# Patient Record
Sex: MALE | Race: WHITE | NOT HISPANIC OR LATINO | Employment: OTHER | URBAN - METROPOLITAN AREA
[De-identification: names, ages, dates, MRNs, and addresses within clinical notes are randomized per-mention and may not be internally consistent; named-entity substitution may affect disease eponyms.]

---

## 2017-01-01 ENCOUNTER — APPOINTMENT (OUTPATIENT)
Dept: CARDIOLOGY | Facility: HOSPITAL | Age: 77
End: 2017-01-01
Attending: INTERNAL MEDICINE

## 2017-01-01 ENCOUNTER — APPOINTMENT (OUTPATIENT)
Dept: GENERAL RADIOLOGY | Facility: HOSPITAL | Age: 77
End: 2017-01-01

## 2017-01-01 ENCOUNTER — APPOINTMENT (OUTPATIENT)
Dept: NEUROLOGY | Facility: HOSPITAL | Age: 77
End: 2017-01-01
Attending: PSYCHIATRY & NEUROLOGY

## 2017-01-01 ENCOUNTER — APPOINTMENT (OUTPATIENT)
Dept: CARDIOLOGY | Facility: HOSPITAL | Age: 77
End: 2017-01-01

## 2017-01-01 ENCOUNTER — APPOINTMENT (OUTPATIENT)
Dept: MRI IMAGING | Facility: HOSPITAL | Age: 77
End: 2017-01-01

## 2017-01-01 ENCOUNTER — HOSPITAL ENCOUNTER (INPATIENT)
Facility: HOSPITAL | Age: 77
LOS: 4 days | End: 2017-04-10
Attending: FAMILY MEDICINE | Admitting: FAMILY MEDICINE

## 2017-01-01 ENCOUNTER — APPOINTMENT (OUTPATIENT)
Dept: CT IMAGING | Facility: HOSPITAL | Age: 77
End: 2017-01-01

## 2017-01-01 ENCOUNTER — HOSPITAL ENCOUNTER (INPATIENT)
Facility: HOSPITAL | Age: 77
LOS: 13 days | End: 2017-04-06
Attending: INTERNAL MEDICINE | Admitting: INTERNAL MEDICINE

## 2017-01-01 VITALS
RESPIRATION RATE: 20 BRPM | WEIGHT: 146.9 LBS | TEMPERATURE: 97.7 F | BODY MASS INDEX: 23.61 KG/M2 | DIASTOLIC BLOOD PRESSURE: 53 MMHG | HEART RATE: 89 BPM | HEIGHT: 66 IN | SYSTOLIC BLOOD PRESSURE: 94 MMHG | OXYGEN SATURATION: 93 %

## 2017-01-01 VITALS
HEIGHT: 66 IN | BODY MASS INDEX: 23.61 KG/M2 | WEIGHT: 146.9 LBS | HEART RATE: 77 BPM | OXYGEN SATURATION: 86 % | RESPIRATION RATE: 22 BRPM | SYSTOLIC BLOOD PRESSURE: 84 MMHG | TEMPERATURE: 98.2 F | DIASTOLIC BLOOD PRESSURE: 47 MMHG

## 2017-01-01 DIAGNOSIS — J96.01 ACUTE RESPIRATORY FAILURE WITH HYPOXIA (HCC): ICD-10-CM

## 2017-01-01 DIAGNOSIS — R13.10 DYSPHAGIA, UNSPECIFIED TYPE: Primary | ICD-10-CM

## 2017-01-01 DIAGNOSIS — Z74.09 IMPAIRED FUNCTIONAL MOBILITY, BALANCE, GAIT, AND ENDURANCE: ICD-10-CM

## 2017-01-01 DIAGNOSIS — Z74.09 IMPAIRED MOBILITY AND ADLS: ICD-10-CM

## 2017-01-01 DIAGNOSIS — Z78.9 IMPAIRED MOBILITY AND ADLS: ICD-10-CM

## 2017-01-01 LAB
ALBUMIN SERPL-MCNC: 3.6 G/DL (ref 3.2–4.8)
ALBUMIN SERPL-MCNC: 3.6 G/DL (ref 3.2–4.8)
ALBUMIN SERPL-MCNC: 3.8 G/DL (ref 3.2–4.8)
ALBUMIN SERPL-MCNC: 4.2 G/DL (ref 3.2–4.8)
ALBUMIN/GLOB SERPL: 1.2 G/DL (ref 1.5–2.5)
ALBUMIN/GLOB SERPL: 1.2 G/DL (ref 1.5–2.5)
ALBUMIN/GLOB SERPL: 1.3 G/DL (ref 1.5–2.5)
ALP SERPL-CCNC: 68 U/L (ref 25–100)
ALP SERPL-CCNC: 71 U/L (ref 25–100)
ALP SERPL-CCNC: 74 U/L (ref 25–100)
ALP SERPL-CCNC: 98 U/L (ref 25–100)
ALT SERPL W P-5'-P-CCNC: 15 U/L (ref 7–40)
ALT SERPL W P-5'-P-CCNC: 25 U/L (ref 7–40)
AMMONIA BLD-SCNC: 35 UMOL/L (ref 19–60)
AMMONIA BLD-SCNC: 47 UMOL/L (ref 19–60)
ANION GAP SERPL CALCULATED.3IONS-SCNC: 11 MMOL/L (ref 3–11)
ANION GAP SERPL CALCULATED.3IONS-SCNC: 12 MMOL/L (ref 3–11)
ANION GAP SERPL CALCULATED.3IONS-SCNC: 14 MMOL/L (ref 3–11)
ANION GAP SERPL CALCULATED.3IONS-SCNC: 14 MMOL/L (ref 3–11)
ANION GAP SERPL CALCULATED.3IONS-SCNC: 15 MMOL/L (ref 3–11)
ANION GAP SERPL CALCULATED.3IONS-SCNC: 16 MMOL/L (ref 3–11)
ANION GAP SERPL CALCULATED.3IONS-SCNC: 24 MMOL/L (ref 3–11)
ANION GAP SERPL CALCULATED.3IONS-SCNC: 5 MMOL/L (ref 3–11)
ANION GAP SERPL CALCULATED.3IONS-SCNC: 5 MMOL/L (ref 3–11)
ANION GAP SERPL CALCULATED.3IONS-SCNC: 6 MMOL/L (ref 3–11)
ANION GAP SERPL CALCULATED.3IONS-SCNC: 7 MMOL/L (ref 3–11)
APTT PPP: 104.6 SECONDS (ref 24–31)
APTT PPP: 36.3 SECONDS (ref 24–31)
APTT PPP: 43.7 SECONDS (ref 24–31)
APTT PPP: 44 SECONDS (ref 24–31)
APTT PPP: 44.1 SECONDS (ref 24–31)
APTT PPP: 44.1 SECONDS (ref 24–31)
APTT PPP: 46.6 SECONDS (ref 24–31)
APTT PPP: 47.9 SECONDS (ref 24–31)
APTT PPP: 48.5 SECONDS (ref 24–31)
APTT PPP: 49.9 SECONDS (ref 24–31)
APTT PPP: 50.8 SECONDS (ref 24–31)
APTT PPP: 52 SECONDS (ref 24–31)
APTT PPP: 53.7 SECONDS (ref 24–31)
APTT PPP: 55.5 SECONDS (ref 24–31)
APTT PPP: 67.6 SECONDS (ref 24–31)
ARTERIAL PATENCY WRIST A: ABNORMAL
ARTERIAL PATENCY WRIST A: ABNORMAL
ARTICHOKE IGE QN: 174 MG/DL (ref 0–130)
AST SERPL-CCNC: 49 U/L (ref 0–33)
AST SERPL-CCNC: 62 U/L (ref 0–33)
AST SERPL-CCNC: 66 U/L (ref 0–33)
AST SERPL-CCNC: 77 U/L (ref 0–33)
ATMOSPHERIC PRESS: ABNORMAL MMHG
ATMOSPHERIC PRESS: ABNORMAL MMHG
BACTERIA SPEC AEROBE CULT: NORMAL
BACTERIA UR QL AUTO: ABNORMAL /HPF
BASE EXCESS BLDA CALC-SCNC: -7.6 MMOL/L (ref 0–2)
BASE EXCESS BLDA CALC-SCNC: 4.1 MMOL/L (ref 0–2)
BASOPHILS # BLD AUTO: 0 10*3/MM3 (ref 0–0.2)
BASOPHILS # BLD AUTO: 0.02 10*3/MM3 (ref 0–0.2)
BASOPHILS NFR BLD AUTO: 0 % (ref 0–1)
BASOPHILS NFR BLD AUTO: 0.2 % (ref 0–1)
BDY SITE: ABNORMAL
BDY SITE: ABNORMAL
BH CV ECHO MEAS - AO MAX PG (FULL): 3.2 MMHG
BH CV ECHO MEAS - AO MAX PG: 4.4 MMHG
BH CV ECHO MEAS - AO MEAN PG (FULL): 1 MMHG
BH CV ECHO MEAS - AO MEAN PG: 2 MMHG
BH CV ECHO MEAS - AO ROOT AREA (BSA CORRECTED): 1.8
BH CV ECHO MEAS - AO ROOT AREA: 8.6 CM^2
BH CV ECHO MEAS - AO ROOT DIAM: 3.3 CM
BH CV ECHO MEAS - AO V2 MAX: 105 CM/SEC
BH CV ECHO MEAS - AO V2 MEAN: 65.4 CM/SEC
BH CV ECHO MEAS - AO V2 VTI: 21.4 CM
BH CV ECHO MEAS - BSA(HAYCOCK): 1.8 M^2
BH CV ECHO MEAS - BSA: 1.8 M^2
BH CV ECHO MEAS - BZI_BMI: 24.7 KILOGRAMS/M^2
BH CV ECHO MEAS - BZI_METRIC_HEIGHT: 167.6 CM
BH CV ECHO MEAS - BZI_METRIC_WEIGHT: 69.4 KG
BH CV ECHO MEAS - EDV(CUBED): 191.1 ML
BH CV ECHO MEAS - EDV(TEICH): 163.9 ML
BH CV ECHO MEAS - EF(CUBED): 18.5 %
BH CV ECHO MEAS - EF(TEICH): 14.5 %
BH CV ECHO MEAS - ESV(CUBED): 155.7 ML
BH CV ECHO MEAS - ESV(TEICH): 140.1 ML
BH CV ECHO MEAS - FS: 6.6 %
BH CV ECHO MEAS - IVS/LVPW: 0.99
BH CV ECHO MEAS - IVSD: 0.86 CM
BH CV ECHO MEAS - LA DIMENSION: 4.2 CM
BH CV ECHO MEAS - LA/AO: 1.3
BH CV ECHO MEAS - LV MASS(C)D: 189.2 GRAMS
BH CV ECHO MEAS - LV MASS(C)DI: 106 GRAMS/M^2
BH CV ECHO MEAS - LV MAX PG: 1.2 MMHG
BH CV ECHO MEAS - LV MEAN PG: 1 MMHG
BH CV ECHO MEAS - LV V1 MAX: 54.9 CM/SEC
BH CV ECHO MEAS - LV V1 MEAN: 33.7 CM/SEC
BH CV ECHO MEAS - LV V1 VTI: 8.5 CM
BH CV ECHO MEAS - LVIDD: 5.8 CM
BH CV ECHO MEAS - LVIDS: 5.4 CM
BH CV ECHO MEAS - LVPWD: 0.86 CM
BH CV ECHO MEAS - MV A MAX VEL: 65.2 CM/SEC
BH CV ECHO MEAS - MV DEC TIME: 0.11 SEC
BH CV ECHO MEAS - MV E MAX VEL: 68.1 CM/SEC
BH CV ECHO MEAS - MV E/A: 1
BH CV ECHO MEAS - PA ACC SLOPE: 812.5 CM/SEC^2
BH CV ECHO MEAS - PA ACC TIME: 0.11 SEC
BH CV ECHO MEAS - PA MAX PG: 3.1 MMHG
BH CV ECHO MEAS - PA PR(ACCEL): 29.1 MMHG
BH CV ECHO MEAS - PA V2 MAX: 86.2 CM/SEC
BH CV ECHO MEAS - RVDD: 1.8 CM
BH CV ECHO MEAS - SI(AO): 102.5 ML/M^2
BH CV ECHO MEAS - SI(CUBED): 19.8 ML/M^2
BH CV ECHO MEAS - SI(TEICH): 13.3 ML/M^2
BH CV ECHO MEAS - SV(AO): 183 ML
BH CV ECHO MEAS - SV(CUBED): 35.4 ML
BH CV ECHO MEAS - SV(TEICH): 23.8 ML
BH CV ECHO MEAS - TR MAX VEL: 264 CM/SEC
BH CV ECHO MEAS - TV MAX PG: 0.72 MMHG
BH CV ECHO MEAS - TV V2 MAX: 42.4 CM/SEC
BH CV LOWER VASCULAR LEFT COMMON FEMORAL AUGMENT: NORMAL
BH CV LOWER VASCULAR LEFT COMMON FEMORAL COMPRESS: NORMAL
BH CV LOWER VASCULAR LEFT COMMON FEMORAL PHASIC: NORMAL
BH CV LOWER VASCULAR LEFT COMMON FEMORAL SPONT: NORMAL
BH CV LOWER VASCULAR LEFT DISTAL FEMORAL COMPRESS: NORMAL
BH CV LOWER VASCULAR LEFT GASTRONEMIUS COMPRESS: NORMAL
BH CV LOWER VASCULAR LEFT LESSER SAPH COMPRESS: NORMAL
BH CV LOWER VASCULAR LEFT MID FEMORAL AUGMENT: NORMAL
BH CV LOWER VASCULAR LEFT MID FEMORAL COMPRESS: NORMAL
BH CV LOWER VASCULAR LEFT MID FEMORAL PHASIC: NORMAL
BH CV LOWER VASCULAR LEFT MID FEMORAL SPONT: NORMAL
BH CV LOWER VASCULAR LEFT PERONEAL COMPRESS: NORMAL
BH CV LOWER VASCULAR LEFT POPLITEAL AUGMENT: NORMAL
BH CV LOWER VASCULAR LEFT POPLITEAL COMPRESS: NORMAL
BH CV LOWER VASCULAR LEFT POPLITEAL PHASIC: NORMAL
BH CV LOWER VASCULAR LEFT POPLITEAL SPONT: NORMAL
BH CV LOWER VASCULAR LEFT POSTERIOR TIBIAL COMPRESS: NORMAL
BH CV LOWER VASCULAR LEFT PROXIMAL FEMORAL COMPRESS: NORMAL
BH CV LOWER VASCULAR LEFT SAPHENOFEMORAL JUNCTION AUGMENT: NORMAL
BH CV LOWER VASCULAR LEFT SAPHENOFEMORAL JUNCTION COMPRESS: NORMAL
BH CV LOWER VASCULAR LEFT SAPHENOFEMORAL JUNCTION PHASIC: NORMAL
BH CV LOWER VASCULAR LEFT SAPHENOFEMORAL JUNCTION SPONT: NORMAL
BILIRUB SERPL-MCNC: 0.7 MG/DL (ref 0.3–1.2)
BILIRUB SERPL-MCNC: 0.9 MG/DL (ref 0.3–1.2)
BILIRUB SERPL-MCNC: 1 MG/DL (ref 0.3–1.2)
BILIRUB SERPL-MCNC: 1.1 MG/DL (ref 0.3–1.2)
BILIRUB UR QL STRIP: NEGATIVE
BNP SERPL-MCNC: 1824 PG/ML (ref 0–100)
BNP SERPL-MCNC: 3675 PG/ML (ref 0–100)
BUN BLD-MCNC: 16 MG/DL (ref 9–23)
BUN BLD-MCNC: 21 MG/DL (ref 9–23)
BUN BLD-MCNC: 26 MG/DL (ref 9–23)
BUN BLD-MCNC: 37 MG/DL (ref 9–23)
BUN BLD-MCNC: 37 MG/DL (ref 9–23)
BUN BLD-MCNC: 39 MG/DL (ref 9–23)
BUN BLD-MCNC: 43 MG/DL (ref 9–23)
BUN BLD-MCNC: 43 MG/DL (ref 9–23)
BUN BLD-MCNC: 46 MG/DL (ref 9–23)
BUN BLD-MCNC: 51 MG/DL (ref 9–23)
BUN BLD-MCNC: 54 MG/DL (ref 9–23)
BUN/CREAT SERPL: 10.7 (ref 7–25)
BUN/CREAT SERPL: 14 (ref 7–25)
BUN/CREAT SERPL: 17.3 (ref 7–25)
BUN/CREAT SERPL: 30.8 (ref 7–25)
BUN/CREAT SERPL: 33.6 (ref 7–25)
BUN/CREAT SERPL: 39.1 (ref 7–25)
BUN/CREAT SERPL: 39.1 (ref 7–25)
BUN/CREAT SERPL: 41.8 (ref 7–25)
BUN/CREAT SERPL: 51 (ref 7–25)
BUN/CREAT SERPL: 54 (ref 7–25)
CA-I SERPL ISE-MCNC: 1.04 MMOL/L (ref 1.12–1.32)
CA-I SERPL ISE-MCNC: 1.06 MMOL/L (ref 1.12–1.32)
CA-I SERPL ISE-MCNC: 1.07 MMOL/L (ref 1.12–1.32)
CA-I SERPL ISE-MCNC: 1.09 MMOL/L (ref 1.12–1.32)
CALCIUM SPEC-SCNC: 8.9 MG/DL (ref 8.7–10.4)
CALCIUM SPEC-SCNC: 9 MG/DL (ref 8.7–10.4)
CALCIUM SPEC-SCNC: 9.2 MG/DL (ref 8.7–10.4)
CALCIUM SPEC-SCNC: 9.3 MG/DL (ref 8.7–10.4)
CALCIUM SPEC-SCNC: 9.4 MG/DL (ref 8.7–10.4)
CALCIUM SPEC-SCNC: 9.5 MG/DL (ref 8.7–10.4)
CALCIUM SPEC-SCNC: 9.7 MG/DL (ref 8.7–10.4)
CALCIUM SPEC-SCNC: 9.7 MG/DL (ref 8.7–10.4)
CHLORIDE SERPL-SCNC: 100 MMOL/L (ref 99–109)
CHLORIDE SERPL-SCNC: 102 MMOL/L (ref 99–109)
CHLORIDE SERPL-SCNC: 102 MMOL/L (ref 99–109)
CHLORIDE SERPL-SCNC: 103 MMOL/L (ref 99–109)
CHLORIDE SERPL-SCNC: 104 MMOL/L (ref 99–109)
CHLORIDE SERPL-SCNC: 104 MMOL/L (ref 99–109)
CHLORIDE SERPL-SCNC: 105 MMOL/L (ref 99–109)
CHOLEST SERPL-MCNC: 163 MG/DL (ref 0–200)
CHOLEST SERPL-MCNC: 216 MG/DL (ref 0–200)
CK SERPL-CCNC: 1412 U/L (ref 26–174)
CLARITY UR: ABNORMAL
CO2 BLDA-SCNC: 18.4 MMOL/L (ref 22–33)
CO2 BLDA-SCNC: 28.6 MMOL/L (ref 22–33)
CO2 SERPL-SCNC: 13 MMOL/L (ref 20–31)
CO2 SERPL-SCNC: 21 MMOL/L (ref 20–31)
CO2 SERPL-SCNC: 22 MMOL/L (ref 20–31)
CO2 SERPL-SCNC: 23 MMOL/L (ref 20–31)
CO2 SERPL-SCNC: 25 MMOL/L (ref 20–31)
CO2 SERPL-SCNC: 26 MMOL/L (ref 20–31)
CO2 SERPL-SCNC: 27 MMOL/L (ref 20–31)
CO2 SERPL-SCNC: 28 MMOL/L (ref 20–31)
CO2 SERPL-SCNC: 28 MMOL/L (ref 20–31)
CO2 SERPL-SCNC: 31 MMOL/L (ref 20–31)
CO2 SERPL-SCNC: 32 MMOL/L (ref 20–31)
COHGB MFR BLD: 1.5 % (ref 0–2)
COHGB MFR BLD: 1.7 % (ref 0–2)
COLOR UR: YELLOW
CREAT BLD-MCNC: 1 MG/DL (ref 0.6–1.3)
CREAT BLD-MCNC: 1 MG/DL (ref 0.6–1.3)
CREAT BLD-MCNC: 1.1 MG/DL (ref 0.6–1.3)
CREAT BLD-MCNC: 1.2 MG/DL (ref 0.6–1.3)
CREAT BLD-MCNC: 1.5 MG/DL (ref 0.6–1.3)
CRP SERPL-MCNC: 86.1 MG/DL (ref 0–10)
D-LACTATE SERPL-SCNC: 3.3 MMOL/L (ref 0.5–2)
D-LACTATE SERPL-SCNC: 5.8 MMOL/L (ref 0.5–2)
D-LACTATE SERPL-SCNC: 6.1 MMOL/L (ref 0.5–2)
DEPRECATED RDW RBC AUTO: 48.6 FL (ref 37–54)
DEPRECATED RDW RBC AUTO: 49.3 FL (ref 37–54)
DEPRECATED RDW RBC AUTO: 50.1 FL (ref 37–54)
DEPRECATED RDW RBC AUTO: 50.4 FL (ref 37–54)
DEPRECATED RDW RBC AUTO: 50.4 FL (ref 37–54)
DEPRECATED RDW RBC AUTO: 53.8 FL (ref 37–54)
EOSINOPHIL # BLD AUTO: 0 10*3/MM3 (ref 0.1–0.3)
EOSINOPHIL # BLD AUTO: 0 10*3/MM3 (ref 0.1–0.3)
EOSINOPHIL # BLD AUTO: 0.01 10*3/MM3 (ref 0.1–0.3)
EOSINOPHIL # BLD AUTO: 0.09 10*3/MM3 (ref 0.1–0.3)
EOSINOPHIL NFR BLD AUTO: 0 % (ref 0–3)
EOSINOPHIL NFR BLD AUTO: 0 % (ref 0–3)
EOSINOPHIL NFR BLD AUTO: 0.1 % (ref 0–3)
EOSINOPHIL NFR BLD AUTO: 1 % (ref 0–3)
ERYTHROCYTE [DISTWIDTH] IN BLOOD BY AUTOMATED COUNT: 13.9 % (ref 11.3–14.5)
ERYTHROCYTE [DISTWIDTH] IN BLOOD BY AUTOMATED COUNT: 14 % (ref 11.3–14.5)
ERYTHROCYTE [DISTWIDTH] IN BLOOD BY AUTOMATED COUNT: 14 % (ref 11.3–14.5)
ERYTHROCYTE [DISTWIDTH] IN BLOOD BY AUTOMATED COUNT: 14.1 % (ref 11.3–14.5)
ERYTHROCYTE [DISTWIDTH] IN BLOOD BY AUTOMATED COUNT: 14.1 % (ref 11.3–14.5)
ERYTHROCYTE [DISTWIDTH] IN BLOOD BY AUTOMATED COUNT: 14.7 % (ref 11.3–14.5)
GFR SERPL CREATININE-BSD FRML MDRD: 45 ML/MIN/1.73
GFR SERPL CREATININE-BSD FRML MDRD: 59 ML/MIN/1.73
GFR SERPL CREATININE-BSD FRML MDRD: 65 ML/MIN/1.73
GFR SERPL CREATININE-BSD FRML MDRD: 72 ML/MIN/1.73
GFR SERPL CREATININE-BSD FRML MDRD: 72 ML/MIN/1.73
GLOBULIN UR ELPH-MCNC: 2.8 GM/DL
GLOBULIN UR ELPH-MCNC: 2.9 GM/DL
GLOBULIN UR ELPH-MCNC: 3.5 GM/DL
GLUCOSE BLD-MCNC: 116 MG/DL (ref 70–100)
GLUCOSE BLD-MCNC: 133 MG/DL (ref 70–100)
GLUCOSE BLD-MCNC: 139 MG/DL (ref 70–100)
GLUCOSE BLD-MCNC: 146 MG/DL (ref 70–100)
GLUCOSE BLD-MCNC: 170 MG/DL (ref 70–100)
GLUCOSE BLD-MCNC: 180 MG/DL (ref 70–100)
GLUCOSE BLD-MCNC: 259 MG/DL (ref 70–100)
GLUCOSE BLD-MCNC: 267 MG/DL (ref 70–100)
GLUCOSE BLD-MCNC: 272 MG/DL (ref 70–100)
GLUCOSE BLD-MCNC: 302 MG/DL (ref 70–100)
GLUCOSE BLD-MCNC: 335 MG/DL (ref 70–100)
GLUCOSE BLDC GLUCOMTR-MCNC: 106 MG/DL (ref 70–130)
GLUCOSE BLDC GLUCOMTR-MCNC: 107 MG/DL (ref 70–130)
GLUCOSE BLDC GLUCOMTR-MCNC: 120 MG/DL (ref 70–130)
GLUCOSE BLDC GLUCOMTR-MCNC: 121 MG/DL (ref 70–130)
GLUCOSE BLDC GLUCOMTR-MCNC: 122 MG/DL (ref 70–130)
GLUCOSE BLDC GLUCOMTR-MCNC: 129 MG/DL (ref 70–130)
GLUCOSE BLDC GLUCOMTR-MCNC: 131 MG/DL (ref 70–130)
GLUCOSE BLDC GLUCOMTR-MCNC: 131 MG/DL (ref 70–130)
GLUCOSE BLDC GLUCOMTR-MCNC: 132 MG/DL (ref 70–130)
GLUCOSE BLDC GLUCOMTR-MCNC: 133 MG/DL (ref 70–130)
GLUCOSE BLDC GLUCOMTR-MCNC: 134 MG/DL (ref 70–130)
GLUCOSE BLDC GLUCOMTR-MCNC: 135 MG/DL (ref 70–130)
GLUCOSE BLDC GLUCOMTR-MCNC: 135 MG/DL (ref 70–130)
GLUCOSE BLDC GLUCOMTR-MCNC: 143 MG/DL (ref 70–130)
GLUCOSE BLDC GLUCOMTR-MCNC: 148 MG/DL (ref 70–130)
GLUCOSE BLDC GLUCOMTR-MCNC: 156 MG/DL (ref 70–130)
GLUCOSE BLDC GLUCOMTR-MCNC: 157 MG/DL (ref 70–130)
GLUCOSE BLDC GLUCOMTR-MCNC: 161 MG/DL (ref 70–130)
GLUCOSE BLDC GLUCOMTR-MCNC: 168 MG/DL (ref 70–130)
GLUCOSE BLDC GLUCOMTR-MCNC: 173 MG/DL (ref 70–130)
GLUCOSE BLDC GLUCOMTR-MCNC: 183 MG/DL (ref 70–130)
GLUCOSE BLDC GLUCOMTR-MCNC: 190 MG/DL (ref 70–130)
GLUCOSE BLDC GLUCOMTR-MCNC: 192 MG/DL (ref 70–130)
GLUCOSE BLDC GLUCOMTR-MCNC: 194 MG/DL (ref 70–130)
GLUCOSE BLDC GLUCOMTR-MCNC: 217 MG/DL (ref 70–130)
GLUCOSE BLDC GLUCOMTR-MCNC: 220 MG/DL (ref 70–130)
GLUCOSE BLDC GLUCOMTR-MCNC: 224 MG/DL (ref 70–130)
GLUCOSE BLDC GLUCOMTR-MCNC: 226 MG/DL (ref 70–130)
GLUCOSE BLDC GLUCOMTR-MCNC: 229 MG/DL (ref 70–130)
GLUCOSE BLDC GLUCOMTR-MCNC: 230 MG/DL (ref 70–130)
GLUCOSE BLDC GLUCOMTR-MCNC: 230 MG/DL (ref 70–130)
GLUCOSE BLDC GLUCOMTR-MCNC: 237 MG/DL (ref 70–130)
GLUCOSE BLDC GLUCOMTR-MCNC: 242 MG/DL (ref 70–130)
GLUCOSE BLDC GLUCOMTR-MCNC: 244 MG/DL (ref 70–130)
GLUCOSE BLDC GLUCOMTR-MCNC: 253 MG/DL (ref 70–130)
GLUCOSE BLDC GLUCOMTR-MCNC: 255 MG/DL (ref 70–130)
GLUCOSE BLDC GLUCOMTR-MCNC: 261 MG/DL (ref 70–130)
GLUCOSE BLDC GLUCOMTR-MCNC: 264 MG/DL (ref 70–130)
GLUCOSE BLDC GLUCOMTR-MCNC: 271 MG/DL (ref 70–130)
GLUCOSE BLDC GLUCOMTR-MCNC: 292 MG/DL (ref 70–130)
GLUCOSE BLDC GLUCOMTR-MCNC: 307 MG/DL (ref 70–130)
GLUCOSE BLDC GLUCOMTR-MCNC: 323 MG/DL (ref 70–130)
GLUCOSE UR STRIP-MCNC: NEGATIVE MG/DL
GRAM STN SPEC: NORMAL
HBA1C MFR BLD: 7.2 % (ref 4.8–5.6)
HCO3 BLDA-SCNC: 17.4 MMOL/L (ref 20–26)
HCO3 BLDA-SCNC: 27.5 MMOL/L (ref 20–26)
HCT VFR BLD AUTO: 43.3 % (ref 38.9–50.9)
HCT VFR BLD AUTO: 43.5 % (ref 38.9–50.9)
HCT VFR BLD AUTO: 44 % (ref 38.9–50.9)
HCT VFR BLD AUTO: 45.8 % (ref 38.9–50.9)
HCT VFR BLD AUTO: 49.8 % (ref 38.9–50.9)
HCT VFR BLD AUTO: 51.5 % (ref 38.9–50.9)
HCT VFR BLD CALC: 46.6 %
HCT VFR BLD CALC: 54.6 %
HDLC SERPL-MCNC: 39 MG/DL (ref 40–60)
HGB BLD-MCNC: 14.3 G/DL (ref 13.1–17.5)
HGB BLD-MCNC: 14.8 G/DL (ref 13.1–17.5)
HGB BLD-MCNC: 15.3 G/DL (ref 13.1–17.5)
HGB BLD-MCNC: 15.9 G/DL (ref 13.1–17.5)
HGB BLD-MCNC: 17.1 G/DL (ref 13.1–17.5)
HGB BLD-MCNC: 17.6 G/DL (ref 13.1–17.5)
HGB BLDA-MCNC: 15.2 G/DL (ref 13.5–17.5)
HGB BLDA-MCNC: 17.8 G/DL (ref 13.5–17.5)
HGB UR QL STRIP.AUTO: ABNORMAL
HOROWITZ INDEX BLD+IHG-RTO: 35 %
HOROWITZ INDEX BLD+IHG-RTO: 90 %
HYALINE CASTS UR QL AUTO: ABNORMAL /LPF
IMM GRANULOCYTES # BLD: 0.01 10*3/MM3 (ref 0–0.03)
IMM GRANULOCYTES # BLD: 0.01 10*3/MM3 (ref 0–0.03)
IMM GRANULOCYTES # BLD: 0.03 10*3/MM3 (ref 0–0.03)
IMM GRANULOCYTES # BLD: 0.03 10*3/MM3 (ref 0–0.03)
IMM GRANULOCYTES NFR BLD: 0.1 % (ref 0–0.6)
IMM GRANULOCYTES NFR BLD: 0.1 % (ref 0–0.6)
IMM GRANULOCYTES NFR BLD: 0.3 % (ref 0–0.6)
IMM GRANULOCYTES NFR BLD: 0.3 % (ref 0–0.6)
INR PPP: 1.3
KETONES UR QL STRIP: NEGATIVE
LEUKOCYTE ESTERASE UR QL STRIP.AUTO: ABNORMAL
LYMPHOCYTES # BLD AUTO: 0.43 10*3/MM3 (ref 0.6–4.8)
LYMPHOCYTES # BLD AUTO: 0.93 10*3/MM3 (ref 0.6–4.8)
LYMPHOCYTES # BLD AUTO: 0.93 10*3/MM3 (ref 0.6–4.8)
LYMPHOCYTES # BLD AUTO: 1.44 10*3/MM3 (ref 0.6–4.8)
LYMPHOCYTES NFR BLD AUTO: 11.2 % (ref 24–44)
LYMPHOCYTES NFR BLD AUTO: 16.7 % (ref 24–44)
LYMPHOCYTES NFR BLD AUTO: 3.8 % (ref 24–44)
LYMPHOCYTES NFR BLD AUTO: 7.9 % (ref 24–44)
MAGNESIUM SERPL-MCNC: 2 MG/DL (ref 1.3–2.7)
MAGNESIUM SERPL-MCNC: 2.1 MG/DL (ref 1.3–2.7)
MAGNESIUM SERPL-MCNC: 2.2 MG/DL (ref 1.3–2.7)
MAGNESIUM SERPL-MCNC: 2.4 MG/DL (ref 1.3–2.7)
MAGNESIUM SERPL-MCNC: 2.5 MG/DL (ref 1.3–2.7)
MCH RBC QN AUTO: 32.7 PG (ref 27–31)
MCH RBC QN AUTO: 33.2 PG (ref 27–31)
MCH RBC QN AUTO: 33.3 PG (ref 27–31)
MCH RBC QN AUTO: 33.3 PG (ref 27–31)
MCH RBC QN AUTO: 33.7 PG (ref 27–31)
MCH RBC QN AUTO: 33.8 PG (ref 27–31)
MCHC RBC AUTO-ENTMCNC: 32.9 G/DL (ref 32–36)
MCHC RBC AUTO-ENTMCNC: 34.2 G/DL (ref 32–36)
MCHC RBC AUTO-ENTMCNC: 34.2 G/DL (ref 32–36)
MCHC RBC AUTO-ENTMCNC: 34.3 G/DL (ref 32–36)
MCHC RBC AUTO-ENTMCNC: 34.7 G/DL (ref 32–36)
MCHC RBC AUTO-ENTMCNC: 34.8 G/DL (ref 32–36)
MCV RBC AUTO: 101.4 FL (ref 80–99)
MCV RBC AUTO: 95.6 FL (ref 80–99)
MCV RBC AUTO: 95.9 FL (ref 80–99)
MCV RBC AUTO: 96.7 FL (ref 80–99)
MCV RBC AUTO: 97 FL (ref 80–99)
MCV RBC AUTO: 98.8 FL (ref 80–99)
METHGB BLD QL: 0.4 % (ref 0–1.5)
METHGB BLD QL: 0.7 % (ref 0–1.5)
MODALITY: ABNORMAL
MODALITY: ABNORMAL
MONOCYTES # BLD AUTO: 0.46 10*3/MM3 (ref 0–1)
MONOCYTES # BLD AUTO: 0.48 10*3/MM3 (ref 0–1)
MONOCYTES # BLD AUTO: 0.58 10*3/MM3 (ref 0–1)
MONOCYTES # BLD AUTO: 0.65 10*3/MM3 (ref 0–1)
MONOCYTES NFR BLD AUTO: 4.1 % (ref 0–12)
MONOCYTES NFR BLD AUTO: 5.1 % (ref 0–12)
MONOCYTES NFR BLD AUTO: 5.5 % (ref 0–12)
MONOCYTES NFR BLD AUTO: 7.5 % (ref 0–12)
NEUTROPHILS # BLD AUTO: 10.35 10*3/MM3 (ref 1.5–8.3)
NEUTROPHILS # BLD AUTO: 10.39 10*3/MM3 (ref 1.5–8.3)
NEUTROPHILS # BLD AUTO: 6.39 10*3/MM3 (ref 1.5–8.3)
NEUTROPHILS # BLD AUTO: 6.94 10*3/MM3 (ref 1.5–8.3)
NEUTROPHILS NFR BLD AUTO: 74.3 % (ref 41–71)
NEUTROPHILS NFR BLD AUTO: 83.2 % (ref 41–71)
NEUTROPHILS NFR BLD AUTO: 87.7 % (ref 41–71)
NEUTROPHILS NFR BLD AUTO: 90.9 % (ref 41–71)
NITRITE UR QL STRIP: NEGATIVE
OXYHGB MFR BLDV: 92.8 % (ref 94–99)
OXYHGB MFR BLDV: 96.7 % (ref 94–99)
PCO2 BLDA: 32.8 MM HG (ref 35–48)
PCO2 BLDA: 36 MM HG (ref 35–48)
PH BLDA: 7.33 PH UNITS (ref 7.35–7.45)
PH BLDA: 7.49 PH UNITS (ref 7.35–7.45)
PH UR STRIP.AUTO: <=5 [PH] (ref 5–8)
PHOSPHATE SERPL-MCNC: 2.4 MG/DL (ref 2.4–5.1)
PHOSPHATE SERPL-MCNC: 2.5 MG/DL (ref 2.4–5.1)
PHOSPHATE SERPL-MCNC: 2.7 MG/DL (ref 2.4–5.1)
PHOSPHATE SERPL-MCNC: 3 MG/DL (ref 2.4–5.1)
PHOSPHATE SERPL-MCNC: 3.3 MG/DL (ref 2.4–5.1)
PHOSPHATE SERPL-MCNC: 4 MG/DL (ref 2.4–5.1)
PHOSPHATE SERPL-MCNC: 4.1 MG/DL (ref 2.4–5.1)
PLATELET # BLD AUTO: 117 10*3/MM3 (ref 150–450)
PLATELET # BLD AUTO: 120 10*3/MM3 (ref 150–450)
PLATELET # BLD AUTO: 126 10*3/MM3 (ref 150–450)
PLATELET # BLD AUTO: 129 10*3/MM3 (ref 150–450)
PLATELET # BLD AUTO: 132 10*3/MM3 (ref 150–450)
PLATELET # BLD AUTO: 156 10*3/MM3 (ref 150–450)
PMV BLD AUTO: 11.2 FL (ref 6–12)
PMV BLD AUTO: 11.2 FL (ref 6–12)
PMV BLD AUTO: 11.5 FL (ref 6–12)
PMV BLD AUTO: 11.5 FL (ref 6–12)
PMV BLD AUTO: 11.6 FL (ref 6–12)
PMV BLD AUTO: 11.6 FL (ref 6–12)
PO2 BLDA: 105 MM HG (ref 83–108)
PO2 BLDA: 74.3 MM HG (ref 83–108)
POTASSIUM BLD-SCNC: 3.3 MMOL/L (ref 3.5–5.5)
POTASSIUM BLD-SCNC: 3.3 MMOL/L (ref 3.5–5.5)
POTASSIUM BLD-SCNC: 3.6 MMOL/L (ref 3.5–5.5)
POTASSIUM BLD-SCNC: 3.6 MMOL/L (ref 3.5–5.5)
POTASSIUM BLD-SCNC: 3.8 MMOL/L (ref 3.5–5.5)
POTASSIUM BLD-SCNC: 3.9 MMOL/L (ref 3.5–5.5)
POTASSIUM BLD-SCNC: 4 MMOL/L (ref 3.5–5.5)
POTASSIUM BLD-SCNC: 4 MMOL/L (ref 3.5–5.5)
POTASSIUM BLD-SCNC: 4.1 MMOL/L (ref 3.5–5.5)
POTASSIUM BLD-SCNC: 4.6 MMOL/L (ref 3.5–5.5)
PREALB SERPL-MCNC: 10.1 MG/DL (ref 10–40)
PROCALCITONIN SERPL-MCNC: 0.4 NG/ML
PROT SERPL-MCNC: 6.4 G/DL (ref 5.7–8.2)
PROT SERPL-MCNC: 6.5 G/DL (ref 5.7–8.2)
PROT SERPL-MCNC: 6.7 G/DL (ref 5.7–8.2)
PROT SERPL-MCNC: 7.7 G/DL (ref 5.7–8.2)
PROT UR QL STRIP: NEGATIVE
PROTHROMBIN TIME: 14.3 SECONDS (ref 9.6–11.5)
RBC # BLD AUTO: 4.29 10*6/MM3 (ref 4.2–5.76)
RBC # BLD AUTO: 4.53 10*6/MM3 (ref 4.2–5.76)
RBC # BLD AUTO: 4.59 10*6/MM3 (ref 4.2–5.76)
RBC # BLD AUTO: 4.72 10*6/MM3 (ref 4.2–5.76)
RBC # BLD AUTO: 5.15 10*6/MM3 (ref 4.2–5.76)
RBC # BLD AUTO: 5.21 10*6/MM3 (ref 4.2–5.76)
RBC # UR: ABNORMAL /HPF
REF LAB TEST METHOD: ABNORMAL
RIGHT ANT TIBEAL SYS PSV: 86 CM/S
RIGHT CFA PROX SYS PSV: 111 CM/SEC
RIGHT PERONEAL SYS PSV: 35 CM/S
RIGHT POPLITEAL DIST SYS PSV: 90 CM/S
RIGHT POPLITEAL PROX SYS PSV: 62 CM/S
RIGHT POST TIBIAL SYS PSV: 74 CM/S
RIGHT PROFUNDA SYS PSV: 182 CM/S
RIGHT SUPER FEMORAL DIST SYS PSV: 78 CM/S
RIGHT SUPER FEMORAL MID SYS PSV: 112 CM/S
RIGHT SUPER FEMORAL PROX SYS PSV: 341 CM/S
SAO2 % BLDCOA: 92.8 %
SODIUM BLD-SCNC: 137 MMOL/L (ref 132–146)
SODIUM BLD-SCNC: 138 MMOL/L (ref 132–146)
SODIUM BLD-SCNC: 139 MMOL/L (ref 132–146)
SODIUM BLD-SCNC: 140 MMOL/L (ref 132–146)
SODIUM BLD-SCNC: 142 MMOL/L (ref 132–146)
SODIUM BLD-SCNC: 142 MMOL/L (ref 132–146)
SP GR UR STRIP: 1.01 (ref 1–1.03)
SQUAMOUS #/AREA URNS HPF: ABNORMAL /HPF
T4 FREE SERPL-MCNC: 1.11 NG/DL (ref 0.89–1.76)
TRIGL SERPL-MCNC: 114 MG/DL (ref 0–150)
TRIGL SERPL-MCNC: 118 MG/DL (ref 0–150)
TROPONIN I SERPL-MCNC: 17.4 NG/ML
TROPONIN I SERPL-MCNC: 27.54 NG/ML
TROPONIN I SERPL-MCNC: 38.15 NG/ML
TROPONIN I SERPL-MCNC: 40.93 NG/ML
TSH SERPL DL<=0.05 MIU/L-ACNC: 3.58 MIU/ML (ref 0.35–5.35)
TSH SERPL DL<=0.05 MIU/L-ACNC: 5.26 MIU/ML (ref 0.35–5.35)
TSH SERPL DL<=0.05 MIU/L-ACNC: 9.72 MIU/ML (ref 0.35–5.35)
UROBILINOGEN UR QL STRIP: ABNORMAL
VANCOMYCIN TROUGH SERPL-MCNC: 11.2 MCG/ML (ref 10–20)
WBC NRBC COR # BLD: 10.2 10*3/MM3 (ref 3.5–10.8)
WBC NRBC COR # BLD: 11.42 10*3/MM3 (ref 3.5–10.8)
WBC NRBC COR # BLD: 11.79 10*3/MM3 (ref 3.5–10.8)
WBC NRBC COR # BLD: 8.34 10*3/MM3 (ref 3.5–10.8)
WBC NRBC COR # BLD: 8.42 10*3/MM3 (ref 3.5–10.8)
WBC NRBC COR # BLD: 8.62 10*3/MM3 (ref 3.5–10.8)
WBC UR QL AUTO: ABNORMAL /HPF

## 2017-01-01 PROCEDURE — 94799 UNLISTED PULMONARY SVC/PX: CPT

## 2017-01-01 PROCEDURE — 84132 ASSAY OF SERUM POTASSIUM: CPT | Performed by: INTERNAL MEDICINE

## 2017-01-01 PROCEDURE — 25010000003 CEFTRIAXONE PER 250 MG: Performed by: NURSE PRACTITIONER

## 2017-01-01 PROCEDURE — 82962 GLUCOSE BLOOD TEST: CPT

## 2017-01-01 PROCEDURE — 84443 ASSAY THYROID STIM HORMONE: CPT | Performed by: NURSE PRACTITIONER

## 2017-01-01 PROCEDURE — 25010000002 MORPHINE SULFATE (PF) 2 MG/ML SOLUTION: Performed by: NURSE PRACTITIONER

## 2017-01-01 PROCEDURE — 94760 N-INVAS EAR/PLS OXIMETRY 1: CPT

## 2017-01-01 PROCEDURE — 99232 SBSQ HOSP IP/OBS MODERATE 35: CPT | Performed by: PSYCHIATRY & NEUROLOGY

## 2017-01-01 PROCEDURE — 94003 VENT MGMT INPAT SUBQ DAY: CPT

## 2017-01-01 PROCEDURE — 97167 OT EVAL HIGH COMPLEX 60 MIN: CPT

## 2017-01-01 PROCEDURE — 25010000002 LEVETRIRACETAM PER 10 MG: Performed by: PSYCHIATRY & NEUROLOGY

## 2017-01-01 PROCEDURE — C8929 TTE W OR WO FOL WCON,DOPPLER: HCPCS

## 2017-01-01 PROCEDURE — 85025 COMPLETE CBC W/AUTO DIFF WBC: CPT | Performed by: NURSE PRACTITIONER

## 2017-01-01 PROCEDURE — 82805 BLOOD GASES W/O2 SATURATION: CPT | Performed by: NURSE PRACTITIONER

## 2017-01-01 PROCEDURE — 95819 EEG AWAKE AND ASLEEP: CPT

## 2017-01-01 PROCEDURE — 25010000002 MORPHINE SULFATE (PF) 2 MG/ML SOLUTION: Performed by: FAMILY MEDICINE

## 2017-01-01 PROCEDURE — 93971 EXTREMITY STUDY: CPT

## 2017-01-01 PROCEDURE — 85027 COMPLETE CBC AUTOMATED: CPT | Performed by: INTERNAL MEDICINE

## 2017-01-01 PROCEDURE — 84100 ASSAY OF PHOSPHORUS: CPT | Performed by: INTERNAL MEDICINE

## 2017-01-01 PROCEDURE — 81001 URINALYSIS AUTO W/SCOPE: CPT | Performed by: NURSE PRACTITIONER

## 2017-01-01 PROCEDURE — 85730 THROMBOPLASTIN TIME PARTIAL: CPT

## 2017-01-01 PROCEDURE — 99291 CRITICAL CARE FIRST HOUR: CPT | Performed by: INTERNAL MEDICINE

## 2017-01-01 PROCEDURE — 84145 PROCALCITONIN (PCT): CPT | Performed by: NURSE PRACTITIONER

## 2017-01-01 PROCEDURE — 99232 SBSQ HOSP IP/OBS MODERATE 35: CPT | Performed by: HOSPITALIST

## 2017-01-01 PROCEDURE — 99232 SBSQ HOSP IP/OBS MODERATE 35: CPT | Performed by: INTERNAL MEDICINE

## 2017-01-01 PROCEDURE — 84443 ASSAY THYROID STIM HORMONE: CPT | Performed by: INTERNAL MEDICINE

## 2017-01-01 PROCEDURE — 87205 SMEAR GRAM STAIN: CPT | Performed by: INTERNAL MEDICINE

## 2017-01-01 PROCEDURE — 94640 AIRWAY INHALATION TREATMENT: CPT

## 2017-01-01 PROCEDURE — 80061 LIPID PANEL: CPT | Performed by: NURSE PRACTITIONER

## 2017-01-01 PROCEDURE — 80048 BASIC METABOLIC PNL TOTAL CA: CPT | Performed by: INTERNAL MEDICINE

## 2017-01-01 PROCEDURE — 71010 HC CHEST PA OR AP: CPT

## 2017-01-01 PROCEDURE — 25010000002 FUROSEMIDE PER 20 MG: Performed by: INTERNAL MEDICINE

## 2017-01-01 PROCEDURE — 97110 THERAPEUTIC EXERCISES: CPT

## 2017-01-01 PROCEDURE — 25010000002 HEPARIN (PORCINE) PER 1000 UNITS: Performed by: INTERNAL MEDICINE

## 2017-01-01 PROCEDURE — 25010000002 LORAZEPAM PER 2 MG: Performed by: FAMILY MEDICINE

## 2017-01-01 PROCEDURE — 63710000001 INSULIN REGULAR HUMAN PER 5 UNITS

## 2017-01-01 PROCEDURE — 25010000002 PIPERACILLIN-TAZOBACTAM: Performed by: HOSPITALIST

## 2017-01-01 PROCEDURE — 85730 THROMBOPLASTIN TIME PARTIAL: CPT | Performed by: NURSE PRACTITIONER

## 2017-01-01 PROCEDURE — 99239 HOSP IP/OBS DSCHRG MGMT >30: CPT | Performed by: PHYSICIAN ASSISTANT

## 2017-01-01 PROCEDURE — 84100 ASSAY OF PHOSPHORUS: CPT | Performed by: NURSE PRACTITIONER

## 2017-01-01 PROCEDURE — 36600 WITHDRAWAL OF ARTERIAL BLOOD: CPT | Performed by: INTERNAL MEDICINE

## 2017-01-01 PROCEDURE — 83880 ASSAY OF NATRIURETIC PEPTIDE: CPT | Performed by: NURSE PRACTITIONER

## 2017-01-01 PROCEDURE — 93926 LOWER EXTREMITY STUDY: CPT

## 2017-01-01 PROCEDURE — 80053 COMPREHEN METABOLIC PANEL: CPT | Performed by: NURSE PRACTITIONER

## 2017-01-01 PROCEDURE — 25010000002 HEPARIN (PORCINE) PER 1000 UNITS: Performed by: NURSE PRACTITIONER

## 2017-01-01 PROCEDURE — 82805 BLOOD GASES W/O2 SATURATION: CPT | Performed by: INTERNAL MEDICINE

## 2017-01-01 PROCEDURE — 5A1945Z RESPIRATORY VENTILATION, 24-96 CONSECUTIVE HOURS: ICD-10-PCS | Performed by: INTERNAL MEDICINE

## 2017-01-01 PROCEDURE — 92610 EVALUATE SWALLOWING FUNCTION: CPT

## 2017-01-01 PROCEDURE — 25010000002 HEPARIN (PORCINE) PER 1000 UNITS

## 2017-01-01 PROCEDURE — 84484 ASSAY OF TROPONIN QUANT: CPT | Performed by: NURSE PRACTITIONER

## 2017-01-01 PROCEDURE — 83735 ASSAY OF MAGNESIUM: CPT | Performed by: INTERNAL MEDICINE

## 2017-01-01 PROCEDURE — 95819 EEG AWAKE AND ASLEEP: CPT | Performed by: PSYCHIATRY & NEUROLOGY

## 2017-01-01 PROCEDURE — 94002 VENT MGMT INPAT INIT DAY: CPT

## 2017-01-01 PROCEDURE — 93005 ELECTROCARDIOGRAM TRACING: CPT | Performed by: INTERNAL MEDICINE

## 2017-01-01 PROCEDURE — 63710000001 INSULIN DETEMIR PER 5 UNITS: Performed by: HOSPITALIST

## 2017-01-01 PROCEDURE — 63710000001 INSULIN LISPRO (HUMAN) PER 5 UNITS: Performed by: HOSPITALIST

## 2017-01-01 PROCEDURE — 25010000002 SULFUR HEXAFLUORIDE MICROSPH 60.7-25 MG RECONSTITUTED SUSPENSION: Performed by: INTERNAL MEDICINE

## 2017-01-01 PROCEDURE — 93005 ELECTROCARDIOGRAM TRACING: CPT | Performed by: NURSE PRACTITIONER

## 2017-01-01 PROCEDURE — 80048 BASIC METABOLIC PNL TOTAL CA: CPT | Performed by: NURSE PRACTITIONER

## 2017-01-01 PROCEDURE — 93926 LOWER EXTREMITY STUDY: CPT | Performed by: INTERNAL MEDICINE

## 2017-01-01 PROCEDURE — 25010000002 VANCOMYCIN PER 500 MG

## 2017-01-01 PROCEDURE — 99223 1ST HOSP IP/OBS HIGH 75: CPT | Performed by: PSYCHIATRY & NEUROLOGY

## 2017-01-01 PROCEDURE — 84478 ASSAY OF TRIGLYCERIDES: CPT | Performed by: INTERNAL MEDICINE

## 2017-01-01 PROCEDURE — 25010000002 VANCOMYCIN HCL IN NACL 1.25-0.9 GM/250ML-% SOLUTION: Performed by: HOSPITALIST

## 2017-01-01 PROCEDURE — 70551 MRI BRAIN STEM W/O DYE: CPT

## 2017-01-01 PROCEDURE — 83605 ASSAY OF LACTIC ACID: CPT | Performed by: INTERNAL MEDICINE

## 2017-01-01 PROCEDURE — 36600 WITHDRAWAL OF ARTERIAL BLOOD: CPT | Performed by: NURSE PRACTITIONER

## 2017-01-01 PROCEDURE — 80202 ASSAY OF VANCOMYCIN: CPT

## 2017-01-01 PROCEDURE — 80053 COMPREHEN METABOLIC PANEL: CPT | Performed by: INTERNAL MEDICINE

## 2017-01-01 PROCEDURE — 82550 ASSAY OF CK (CPK): CPT | Performed by: NURSE PRACTITIONER

## 2017-01-01 PROCEDURE — 87070 CULTURE OTHR SPECIMN AEROBIC: CPT | Performed by: INTERNAL MEDICINE

## 2017-01-01 PROCEDURE — 97164 PT RE-EVAL EST PLAN CARE: CPT

## 2017-01-01 PROCEDURE — 63710000001 INSULIN REGULAR HUMAN PER 5 UNITS: Performed by: HOSPITALIST

## 2017-01-01 PROCEDURE — 82330 ASSAY OF CALCIUM: CPT | Performed by: INTERNAL MEDICINE

## 2017-01-01 PROCEDURE — 93971 EXTREMITY STUDY: CPT | Performed by: INTERNAL MEDICINE

## 2017-01-01 PROCEDURE — 83735 ASSAY OF MAGNESIUM: CPT | Performed by: NURSE PRACTITIONER

## 2017-01-01 PROCEDURE — 82140 ASSAY OF AMMONIA: CPT | Performed by: INTERNAL MEDICINE

## 2017-01-01 PROCEDURE — 83036 HEMOGLOBIN GLYCOSYLATED A1C: CPT | Performed by: NURSE PRACTITIONER

## 2017-01-01 PROCEDURE — 92526 ORAL FUNCTION THERAPY: CPT

## 2017-01-01 PROCEDURE — 86140 C-REACTIVE PROTEIN: CPT | Performed by: INTERNAL MEDICINE

## 2017-01-01 PROCEDURE — 84134 ASSAY OF PREALBUMIN: CPT | Performed by: INTERNAL MEDICINE

## 2017-01-01 PROCEDURE — 70450 CT HEAD/BRAIN W/O DYE: CPT

## 2017-01-01 PROCEDURE — 3E0G76Z INTRODUCTION OF NUTRITIONAL SUBSTANCE INTO UPPER GI, VIA NATURAL OR ARTIFICIAL OPENING: ICD-10-PCS | Performed by: INTERNAL MEDICINE

## 2017-01-01 PROCEDURE — 80048 BASIC METABOLIC PNL TOTAL CA: CPT | Performed by: HOSPITALIST

## 2017-01-01 PROCEDURE — 82330 ASSAY OF CALCIUM: CPT | Performed by: NURSE PRACTITIONER

## 2017-01-01 PROCEDURE — 93306 TTE W/DOPPLER COMPLETE: CPT | Performed by: INTERNAL MEDICINE

## 2017-01-01 PROCEDURE — 25010000003 LEVETIRACETAM IN NACL 0.54% 1500 MG/100ML SOLUTION: Performed by: PSYCHIATRY & NEUROLOGY

## 2017-01-01 PROCEDURE — 82465 ASSAY BLD/SERUM CHOLESTEROL: CPT | Performed by: INTERNAL MEDICINE

## 2017-01-01 PROCEDURE — 83605 ASSAY OF LACTIC ACID: CPT | Performed by: NURSE PRACTITIONER

## 2017-01-01 PROCEDURE — 87040 BLOOD CULTURE FOR BACTERIA: CPT | Performed by: NURSE PRACTITIONER

## 2017-01-01 PROCEDURE — 87040 BLOOD CULTURE FOR BACTERIA: CPT | Performed by: HOSPITALIST

## 2017-01-01 PROCEDURE — 99231 SBSQ HOSP IP/OBS SF/LOW 25: CPT | Performed by: PHYSICIAN ASSISTANT

## 2017-01-01 PROCEDURE — 85610 PROTHROMBIN TIME: CPT | Performed by: NURSE PRACTITIONER

## 2017-01-01 PROCEDURE — 99233 SBSQ HOSP IP/OBS HIGH 50: CPT | Performed by: INTERNAL MEDICINE

## 2017-01-01 PROCEDURE — 87086 URINE CULTURE/COLONY COUNT: CPT | Performed by: NURSE PRACTITIONER

## 2017-01-01 PROCEDURE — 63710000001 INSULIN REGULAR HUMAN PER 5 UNITS: Performed by: NURSE PRACTITIONER

## 2017-01-01 PROCEDURE — 25010000003 POTASSIUM CHLORIDE 10 MEQ/100ML SOLUTION: Performed by: INTERNAL MEDICINE

## 2017-01-01 PROCEDURE — 93010 ELECTROCARDIOGRAM REPORT: CPT | Performed by: INTERNAL MEDICINE

## 2017-01-01 PROCEDURE — 99233 SBSQ HOSP IP/OBS HIGH 50: CPT | Performed by: HOSPITALIST

## 2017-01-01 PROCEDURE — 85025 COMPLETE CBC W/AUTO DIFF WBC: CPT | Performed by: HOSPITALIST

## 2017-01-01 PROCEDURE — 84439 ASSAY OF FREE THYROXINE: CPT | Performed by: NURSE PRACTITIONER

## 2017-01-01 PROCEDURE — 97161 PT EVAL LOW COMPLEX 20 MIN: CPT

## 2017-01-01 PROCEDURE — 99221 1ST HOSP IP/OBS SF/LOW 40: CPT | Performed by: INTERNAL MEDICINE

## 2017-01-01 RX ORDER — SPIRONOLACTONE 25 MG/1
25 TABLET ORAL DAILY
Status: DISCONTINUED | OUTPATIENT
Start: 2017-01-01 | End: 2017-01-01

## 2017-01-01 RX ORDER — ONDANSETRON 4 MG/1
4 TABLET, FILM COATED ORAL EVERY 6 HOURS PRN
Status: DISCONTINUED | OUTPATIENT
Start: 2017-01-01 | End: 2017-01-01 | Stop reason: HOSPADM

## 2017-01-01 RX ORDER — AMOXICILLIN AND CLAVULANATE POTASSIUM 875; 125 MG/1; MG/1
1 TABLET, FILM COATED ORAL EVERY 12 HOURS SCHEDULED
Status: DISCONTINUED | OUTPATIENT
Start: 2017-01-01 | End: 2017-01-01

## 2017-01-01 RX ORDER — PRAVASTATIN SODIUM 40 MG
80 TABLET ORAL NIGHTLY
Status: DISCONTINUED | OUTPATIENT
Start: 2017-01-01 | End: 2017-01-01

## 2017-01-01 RX ORDER — VANCOMYCIN HYDROCHLORIDE 1 G/200ML
1000 INJECTION, SOLUTION INTRAVENOUS EVERY 24 HOURS
Status: DISCONTINUED | OUTPATIENT
Start: 2017-01-01 | End: 2017-01-01

## 2017-01-01 RX ORDER — SACCHAROMYCES BOULARDII 250 MG
250 CAPSULE ORAL 2 TIMES DAILY
Status: CANCELLED | OUTPATIENT
Start: 2017-01-01

## 2017-01-01 RX ORDER — ATROPINE SULFATE 10 MG/ML
2 SOLUTION/ DROPS OPHTHALMIC
Status: DISCONTINUED | OUTPATIENT
Start: 2017-01-01 | End: 2017-01-01 | Stop reason: HOSPADM

## 2017-01-01 RX ORDER — ACETAMINOPHEN 650 MG/1
650 SUPPOSITORY RECTAL EVERY 4 HOURS PRN
Refills: 0
Start: 2017-01-01

## 2017-01-01 RX ORDER — KETOROLAC TROMETHAMINE 15 MG/ML
15 INJECTION, SOLUTION INTRAMUSCULAR; INTRAVENOUS EVERY 6 HOURS PRN
Status: DISCONTINUED | OUTPATIENT
Start: 2017-01-01 | End: 2017-01-01 | Stop reason: HOSPADM

## 2017-01-01 RX ORDER — POTASSIUM CHLORIDE 750 MG/1
40 CAPSULE, EXTENDED RELEASE ORAL AS NEEDED
Status: CANCELLED | OUTPATIENT
Start: 2017-01-01

## 2017-01-01 RX ORDER — MORPHINE SULFATE 2 MG/ML
1 INJECTION, SOLUTION INTRAMUSCULAR; INTRAVENOUS EVERY 6 HOURS
Status: DISCONTINUED | OUTPATIENT
Start: 2017-01-01 | End: 2017-01-01 | Stop reason: HOSPADM

## 2017-01-01 RX ORDER — LISINOPRIL 5 MG/1
5 TABLET ORAL
Status: DISCONTINUED | OUTPATIENT
Start: 2017-01-01 | End: 2017-01-01

## 2017-01-01 RX ORDER — MORPHINE SULFATE 2 MG/ML
2 INJECTION, SOLUTION INTRAMUSCULAR; INTRAVENOUS
Status: DISCONTINUED | OUTPATIENT
Start: 2017-01-01 | End: 2017-01-01 | Stop reason: HOSPADM

## 2017-01-01 RX ORDER — ONDANSETRON 4 MG/1
4 TABLET, FILM COATED ORAL EVERY 6 HOURS PRN
Status: CANCELLED | OUTPATIENT
Start: 2017-01-01

## 2017-01-01 RX ORDER — GLYCOPYRROLATE 0.2 MG/ML
0.4 INJECTION INTRAMUSCULAR; INTRAVENOUS EVERY 4 HOURS
Status: CANCELLED | OUTPATIENT
Start: 2017-01-01

## 2017-01-01 RX ORDER — ONDANSETRON 2 MG/ML
4 INJECTION INTRAMUSCULAR; INTRAVENOUS EVERY 6 HOURS PRN
Status: CANCELLED | OUTPATIENT
Start: 2017-01-01

## 2017-01-01 RX ORDER — CHLORHEXIDINE GLUCONATE 0.12 MG/ML
15 RINSE ORAL EVERY 12 HOURS SCHEDULED
Status: DISCONTINUED | OUTPATIENT
Start: 2017-01-01 | End: 2017-01-01

## 2017-01-01 RX ORDER — SODIUM CHLORIDE 0.9 % (FLUSH) 0.9 %
1-10 SYRINGE (ML) INJECTION AS NEEDED
Status: CANCELLED | OUTPATIENT
Start: 2017-01-01

## 2017-01-01 RX ORDER — FUROSEMIDE 40 MG/1
40 TABLET ORAL 2 TIMES DAILY
Status: DISCONTINUED | OUTPATIENT
Start: 2017-01-01 | End: 2017-01-01

## 2017-01-01 RX ORDER — CLOPIDOGREL BISULFATE 75 MG/1
75 TABLET ORAL DAILY
Status: DISCONTINUED | OUTPATIENT
Start: 2017-01-01 | End: 2017-01-01

## 2017-01-01 RX ORDER — IPRATROPIUM BROMIDE AND ALBUTEROL SULFATE 2.5; .5 MG/3ML; MG/3ML
3 SOLUTION RESPIRATORY (INHALATION)
Status: DISCONTINUED | OUTPATIENT
Start: 2017-01-01 | End: 2017-01-01

## 2017-01-01 RX ORDER — IPRATROPIUM BROMIDE AND ALBUTEROL SULFATE 2.5; .5 MG/3ML; MG/3ML
3 SOLUTION RESPIRATORY (INHALATION) EVERY 4 HOURS PRN
Status: CANCELLED | OUTPATIENT
Start: 2017-01-01

## 2017-01-01 RX ORDER — ASPIRIN 81 MG/1
81 TABLET, CHEWABLE ORAL DAILY
Status: DISCONTINUED | OUTPATIENT
Start: 2017-01-01 | End: 2017-01-01

## 2017-01-01 RX ORDER — DEXTROSE MONOHYDRATE 25 G/50ML
25 INJECTION, SOLUTION INTRAVENOUS
Status: DISCONTINUED | OUTPATIENT
Start: 2017-01-01 | End: 2017-01-01 | Stop reason: DRUGHIGH

## 2017-01-01 RX ORDER — LEVETIRACETAM 100 MG/ML
250 SOLUTION ORAL EVERY 12 HOURS SCHEDULED
Status: CANCELLED | OUTPATIENT
Start: 2017-01-01

## 2017-01-01 RX ORDER — LORAZEPAM 2 MG/ML
2 INJECTION INTRAMUSCULAR
Status: DISCONTINUED | OUTPATIENT
Start: 2017-01-01 | End: 2017-01-01 | Stop reason: HOSPADM

## 2017-01-01 RX ORDER — ACETAMINOPHEN 650 MG/1
650 SUPPOSITORY RECTAL EVERY 4 HOURS PRN
Status: CANCELLED | OUTPATIENT
Start: 2017-01-01

## 2017-01-01 RX ORDER — POTASSIUM CHLORIDE 7.45 MG/ML
10 INJECTION INTRAVENOUS
Status: CANCELLED | OUTPATIENT
Start: 2017-01-01

## 2017-01-01 RX ORDER — ONDANSETRON 2 MG/ML
4 INJECTION INTRAMUSCULAR; INTRAVENOUS EVERY 6 HOURS PRN
Status: DISCONTINUED | OUTPATIENT
Start: 2017-01-01 | End: 2017-01-01 | Stop reason: HOSPADM

## 2017-01-01 RX ORDER — LORAZEPAM 2 MG/ML
0.5 INJECTION INTRAMUSCULAR EVERY 8 HOURS
Status: DISCONTINUED | OUTPATIENT
Start: 2017-01-01 | End: 2017-01-01 | Stop reason: HOSPADM

## 2017-01-01 RX ORDER — VANCOMYCIN HYDROCHLORIDE
20 ONCE
Status: COMPLETED | OUTPATIENT
Start: 2017-01-01 | End: 2017-01-01

## 2017-01-01 RX ORDER — DEXMEDETOMIDINE HYDROCHLORIDE 4 UG/ML
.2-1.5 INJECTION, SOLUTION INTRAVENOUS
Status: DISCONTINUED | OUTPATIENT
Start: 2017-01-01 | End: 2017-01-01

## 2017-01-01 RX ORDER — POTASSIUM CHLORIDE 1.5 G/1.77G
40 POWDER, FOR SOLUTION ORAL AS NEEDED
Status: DISCONTINUED | OUTPATIENT
Start: 2017-01-01 | End: 2017-01-01 | Stop reason: HOSPADM

## 2017-01-01 RX ORDER — CLOPIDOGREL BISULFATE 75 MG/1
600 TABLET ORAL ONCE
Status: COMPLETED | OUTPATIENT
Start: 2017-01-01 | End: 2017-01-01

## 2017-01-01 RX ORDER — CARVEDILOL 3.12 MG/1
3.12 TABLET ORAL EVERY 12 HOURS SCHEDULED
Status: DISCONTINUED | OUTPATIENT
Start: 2017-01-01 | End: 2017-01-01

## 2017-01-01 RX ORDER — GLYCOPYRROLATE 0.2 MG/ML
0.4 INJECTION INTRAMUSCULAR; INTRAVENOUS EVERY 8 HOURS
Status: DISCONTINUED | OUTPATIENT
Start: 2017-01-01 | End: 2017-01-01 | Stop reason: HOSPADM

## 2017-01-01 RX ORDER — LEVETIRACETAM 250 MG/1
250 TABLET ORAL EVERY 12 HOURS SCHEDULED
Status: DISCONTINUED | OUTPATIENT
Start: 2017-01-01 | End: 2017-01-01 | Stop reason: SDUPTHER

## 2017-01-01 RX ORDER — LEVETIRACETAM 100 MG/ML
250 SOLUTION ORAL EVERY 12 HOURS SCHEDULED
Start: 2017-01-01

## 2017-01-01 RX ORDER — FAMOTIDINE 10 MG/ML
20 INJECTION, SOLUTION INTRAVENOUS 2 TIMES DAILY
Status: DISCONTINUED | OUTPATIENT
Start: 2017-01-01 | End: 2017-01-01

## 2017-01-01 RX ORDER — HEPARIN SODIUM 5000 [USP'U]/ML
5000 INJECTION, SOLUTION INTRAVENOUS; SUBCUTANEOUS EVERY 8 HOURS SCHEDULED
Status: DISCONTINUED | OUTPATIENT
Start: 2017-01-01 | End: 2017-01-01

## 2017-01-01 RX ORDER — ACETAMINOPHEN 325 MG/1
650 TABLET ORAL EVERY 4 HOURS PRN
Status: CANCELLED | OUTPATIENT
Start: 2017-01-01

## 2017-01-01 RX ORDER — NITROGLYCERIN 20 MG/100ML
5-200 INJECTION INTRAVENOUS
Status: DISCONTINUED | OUTPATIENT
Start: 2017-01-01 | End: 2017-01-01

## 2017-01-01 RX ORDER — RIVASTIGMINE 4.6 MG/24H
1 PATCH, EXTENDED RELEASE TRANSDERMAL DAILY
Status: DISCONTINUED | OUTPATIENT
Start: 2017-01-01 | End: 2017-01-01

## 2017-01-01 RX ORDER — ASPIRIN 81 MG/1
324 TABLET, CHEWABLE ORAL ONCE
Status: COMPLETED | OUTPATIENT
Start: 2017-01-01 | End: 2017-01-01

## 2017-01-01 RX ORDER — LISINOPRIL 2.5 MG/1
2.5 TABLET ORAL
Status: DISCONTINUED | OUTPATIENT
Start: 2017-01-01 | End: 2017-01-01

## 2017-01-01 RX ORDER — CEFTRIAXONE SODIUM 1 G/50ML
1 INJECTION, SOLUTION INTRAVENOUS
Status: DISCONTINUED | OUTPATIENT
Start: 2017-01-01 | End: 2017-01-01

## 2017-01-01 RX ORDER — LIGHT MINERAL OIL, WHITE PETROLATUM 150; 850 MG/G; MG/G
OINTMENT OPHTHALMIC 3 TIMES DAILY
Status: DISCONTINUED | OUTPATIENT
Start: 2017-01-01 | End: 2017-01-01 | Stop reason: HOSPADM

## 2017-01-01 RX ORDER — DEXTROSE MONOHYDRATE 25 G/50ML
25 INJECTION, SOLUTION INTRAVENOUS
Status: DISCONTINUED | OUTPATIENT
Start: 2017-01-01 | End: 2017-01-01

## 2017-01-01 RX ORDER — DONEPEZIL HYDROCHLORIDE 10 MG/1
10 TABLET, FILM COATED ORAL NIGHTLY
COMMUNITY

## 2017-01-01 RX ORDER — MIDAZOLAM IN 0.9 % SOD.CHLORID 1 MG/ML
1-10 PLASTIC BAG, INJECTION (ML) INTRAVENOUS
Status: DISCONTINUED | OUTPATIENT
Start: 2017-01-01 | End: 2017-01-01

## 2017-01-01 RX ORDER — ASPIRIN 81 MG/1
81 TABLET ORAL DAILY
Status: DISCONTINUED | OUTPATIENT
Start: 2017-01-01 | End: 2017-01-01 | Stop reason: ALTCHOICE

## 2017-01-01 RX ORDER — GLYCOPYRROLATE 0.2 MG/ML
0.4 INJECTION INTRAMUSCULAR; INTRAVENOUS EVERY 4 HOURS
Status: DISCONTINUED | OUTPATIENT
Start: 2017-01-01 | End: 2017-01-01

## 2017-01-01 RX ORDER — SIMVASTATIN 40 MG
40 TABLET ORAL DAILY
COMMUNITY
Start: 2014-04-08

## 2017-01-01 RX ORDER — LEVETIRACETAM 100 MG/ML
250 SOLUTION ORAL EVERY 12 HOURS SCHEDULED
Status: DISCONTINUED | OUTPATIENT
Start: 2017-01-01 | End: 2017-01-01 | Stop reason: HOSPADM

## 2017-01-01 RX ORDER — SODIUM CHLORIDE 0.9 % (FLUSH) 0.9 %
1-10 SYRINGE (ML) INJECTION AS NEEDED
Status: DISCONTINUED | OUTPATIENT
Start: 2017-01-01 | End: 2017-01-01 | Stop reason: HOSPADM

## 2017-01-01 RX ORDER — NICOTINE POLACRILEX 4 MG
15 LOZENGE BUCCAL
Status: DISCONTINUED | OUTPATIENT
Start: 2017-01-01 | End: 2017-01-01

## 2017-01-01 RX ORDER — IPRATROPIUM BROMIDE AND ALBUTEROL SULFATE 2.5; .5 MG/3ML; MG/3ML
3 SOLUTION RESPIRATORY (INHALATION) EVERY 4 HOURS PRN
Status: DISCONTINUED | OUTPATIENT
Start: 2017-01-01 | End: 2017-01-01 | Stop reason: HOSPADM

## 2017-01-01 RX ORDER — ASPIRIN 81 MG/1
162 TABLET ORAL DAILY
COMMUNITY

## 2017-01-01 RX ORDER — POTASSIUM CHLORIDE 1.5 G/1.77G
40 POWDER, FOR SOLUTION ORAL AS NEEDED
Status: CANCELLED | OUTPATIENT
Start: 2017-01-01

## 2017-01-01 RX ORDER — FENTANYL CITRATE-0.9 % NACL/PF 10 MCG/ML
PLASTIC BAG, INJECTION (ML) INTRAVENOUS
Status: COMPLETED
Start: 2017-01-01 | End: 2017-01-01

## 2017-01-01 RX ORDER — HEPARIN SODIUM 1000 [USP'U]/ML
60 INJECTION, SOLUTION INTRAVENOUS; SUBCUTANEOUS AS NEEDED
Status: DISCONTINUED | OUTPATIENT
Start: 2017-01-01 | End: 2017-01-01 | Stop reason: DRUGHIGH

## 2017-01-01 RX ORDER — POTASSIUM CHLORIDE 7.45 MG/ML
10 INJECTION INTRAVENOUS
Status: DISCONTINUED | OUTPATIENT
Start: 2017-01-01 | End: 2017-01-01 | Stop reason: SDUPTHER

## 2017-01-01 RX ORDER — PANTOPRAZOLE SODIUM 40 MG/10ML
40 INJECTION, POWDER, LYOPHILIZED, FOR SOLUTION INTRAVENOUS
Status: DISCONTINUED | OUTPATIENT
Start: 2017-01-01 | End: 2017-01-01

## 2017-01-01 RX ORDER — BISACODYL 10 MG
10 SUPPOSITORY, RECTAL RECTAL DAILY PRN
Status: DISCONTINUED | OUTPATIENT
Start: 2017-01-01 | End: 2017-01-01 | Stop reason: HOSPADM

## 2017-01-01 RX ORDER — ACETAMINOPHEN 650 MG/1
650 SUPPOSITORY RECTAL EVERY 4 HOURS PRN
Status: DISCONTINUED | OUTPATIENT
Start: 2017-01-01 | End: 2017-01-01 | Stop reason: HOSPADM

## 2017-01-01 RX ORDER — FENTANYL CITRATE-0.9 % NACL/PF 10 MCG/ML
50-300 PLASTIC BAG, INJECTION (ML) INTRAVENOUS
Status: DISCONTINUED | OUTPATIENT
Start: 2017-01-01 | End: 2017-01-01

## 2017-01-01 RX ORDER — HEPARIN SODIUM 1000 [USP'U]/ML
30 INJECTION, SOLUTION INTRAVENOUS; SUBCUTANEOUS AS NEEDED
Status: DISCONTINUED | OUTPATIENT
Start: 2017-01-01 | End: 2017-01-01 | Stop reason: DRUGHIGH

## 2017-01-01 RX ORDER — LORAZEPAM 2 MG/ML
0.5 INJECTION INTRAMUSCULAR EVERY 4 HOURS PRN
Status: DISCONTINUED | OUTPATIENT
Start: 2017-01-01 | End: 2017-01-01 | Stop reason: HOSPADM

## 2017-01-01 RX ORDER — GLYCOPYRROLATE 0.2 MG/ML
0.4 INJECTION INTRAMUSCULAR; INTRAVENOUS EVERY 4 HOURS
Status: DISCONTINUED | OUTPATIENT
Start: 2017-01-01 | End: 2017-01-01 | Stop reason: HOSPADM

## 2017-01-01 RX ORDER — FUROSEMIDE 10 MG/ML
40 INJECTION INTRAMUSCULAR; INTRAVENOUS 2 TIMES DAILY
Status: DISCONTINUED | OUTPATIENT
Start: 2017-01-01 | End: 2017-01-01

## 2017-01-01 RX ORDER — SACCHAROMYCES BOULARDII 250 MG
250 CAPSULE ORAL 2 TIMES DAILY
Status: DISCONTINUED | OUTPATIENT
Start: 2017-01-01 | End: 2017-01-01 | Stop reason: HOSPADM

## 2017-01-01 RX ORDER — BISACODYL 10 MG
10 SUPPOSITORY, RECTAL RECTAL ONCE
Status: DISCONTINUED | OUTPATIENT
Start: 2017-01-01 | End: 2017-01-01 | Stop reason: HOSPADM

## 2017-01-01 RX ORDER — BUMETANIDE 0.25 MG/ML
2 INJECTION INTRAMUSCULAR; INTRAVENOUS ONCE
Status: COMPLETED | OUTPATIENT
Start: 2017-01-01 | End: 2017-01-01

## 2017-01-01 RX ORDER — POTASSIUM CHLORIDE 7.45 MG/ML
10 INJECTION INTRAVENOUS
Status: DISCONTINUED | OUTPATIENT
Start: 2017-01-01 | End: 2017-01-01 | Stop reason: HOSPADM

## 2017-01-01 RX ORDER — HEPARIN SODIUM 1000 [USP'U]/ML
57.4 INJECTION, SOLUTION INTRAVENOUS; SUBCUTANEOUS ONCE
Status: COMPLETED | OUTPATIENT
Start: 2017-01-01 | End: 2017-01-01

## 2017-01-01 RX ORDER — LEVETIRACETAM 15 MG/ML
1500 INJECTION INTRAVASCULAR ONCE
Status: COMPLETED | OUTPATIENT
Start: 2017-01-01 | End: 2017-01-01

## 2017-01-01 RX ORDER — ACETAMINOPHEN 325 MG/1
650 TABLET ORAL EVERY 4 HOURS PRN
Status: DISCONTINUED | OUTPATIENT
Start: 2017-01-01 | End: 2017-01-01 | Stop reason: HOSPADM

## 2017-01-01 RX ORDER — POTASSIUM CHLORIDE 750 MG/1
40 CAPSULE, EXTENDED RELEASE ORAL AS NEEDED
Status: DISCONTINUED | OUTPATIENT
Start: 2017-01-01 | End: 2017-01-01 | Stop reason: HOSPADM

## 2017-01-01 RX ORDER — MORPHINE SULFATE 2 MG/ML
2 INJECTION, SOLUTION INTRAMUSCULAR; INTRAVENOUS
Status: CANCELLED | OUTPATIENT
Start: 2017-01-01 | End: 2017-04-15

## 2017-01-01 RX ADMIN — Medication: at 12:03

## 2017-01-01 RX ADMIN — Medication: at 09:38

## 2017-01-01 RX ADMIN — IPRATROPIUM BROMIDE AND ALBUTEROL SULFATE 3 ML: .5; 3 SOLUTION RESPIRATORY (INHALATION) at 07:27

## 2017-01-01 RX ADMIN — CLOPIDOGREL BISULFATE 600 MG: 75 TABLET, FILM COATED ORAL at 02:29

## 2017-01-01 RX ADMIN — Medication: at 21:12

## 2017-01-01 RX ADMIN — TAZOBACTAM SODIUM AND PIPERACILLIN SODIUM 4.5 G: .5; 4 INJECTION, POWDER, LYOPHILIZED, FOR SOLUTION INTRAVENOUS at 21:42

## 2017-01-01 RX ADMIN — AMOXICILLIN AND CLAVULANATE POTASSIUM 1 TABLET: 875; 125 TABLET, FILM COATED ORAL at 08:21

## 2017-01-01 RX ADMIN — ASPIRIN 81 MG: 81 TABLET, COATED ORAL at 08:07

## 2017-01-01 RX ADMIN — ASPIRIN 81 MG: 81 TABLET, COATED ORAL at 08:02

## 2017-01-01 RX ADMIN — INSULIN HUMAN 3 UNITS: 100 INJECTION, SOLUTION PARENTERAL at 18:41

## 2017-01-01 RX ADMIN — CARVEDILOL 3.12 MG: 3.12 TABLET, FILM COATED ORAL at 20:09

## 2017-01-01 RX ADMIN — Medication: at 11:17

## 2017-01-01 RX ADMIN — RIVASTIGMINE TRANSDERMAL SYSTEM 1 PATCH: 4.6 PATCH, EXTENDED RELEASE TRANSDERMAL at 08:01

## 2017-01-01 RX ADMIN — HEPARIN SODIUM 5000 UNITS: 5000 INJECTION, SOLUTION INTRAVENOUS; SUBCUTANEOUS at 05:22

## 2017-01-01 RX ADMIN — IPRATROPIUM BROMIDE AND ALBUTEROL SULFATE 3 ML: .5; 3 SOLUTION RESPIRATORY (INHALATION) at 12:45

## 2017-01-01 RX ADMIN — MORPHINE SULFATE 1 MG: 2 INJECTION, SOLUTION INTRAMUSCULAR; INTRAVENOUS at 00:08

## 2017-01-01 RX ADMIN — IPRATROPIUM BROMIDE AND ALBUTEROL SULFATE 3 ML: .5; 3 SOLUTION RESPIRATORY (INHALATION) at 06:55

## 2017-01-01 RX ADMIN — INSULIN HUMAN 3 UNITS: 100 INJECTION, SOLUTION PARENTERAL at 23:27

## 2017-01-01 RX ADMIN — HEPARIN SODIUM 5000 UNITS: 5000 INJECTION, SOLUTION INTRAVENOUS; SUBCUTANEOUS at 22:36

## 2017-01-01 RX ADMIN — IPRATROPIUM BROMIDE AND ALBUTEROL SULFATE 3 ML: .5; 3 SOLUTION RESPIRATORY (INHALATION) at 19:10

## 2017-01-01 RX ADMIN — TAZOBACTAM SODIUM AND PIPERACILLIN SODIUM 4.5 G: .5; 4 INJECTION, POWDER, LYOPHILIZED, FOR SOLUTION INTRAVENOUS at 03:57

## 2017-01-01 RX ADMIN — CHLORHEXIDINE GLUCONATE 15 ML: 1.2 RINSE ORAL at 23:49

## 2017-01-01 RX ADMIN — ROBINUL 0.4 MG: 0.2 INJECTION INTRAMUSCULAR; INTRAVENOUS at 20:10

## 2017-01-01 RX ADMIN — Medication: at 17:24

## 2017-01-01 RX ADMIN — Medication: at 21:07

## 2017-01-01 RX ADMIN — IPRATROPIUM BROMIDE AND ALBUTEROL SULFATE 3 ML: .5; 3 SOLUTION RESPIRATORY (INHALATION) at 07:11

## 2017-01-01 RX ADMIN — IPRATROPIUM BROMIDE AND ALBUTEROL SULFATE 3 ML: .5; 3 SOLUTION RESPIRATORY (INHALATION) at 12:13

## 2017-01-01 RX ADMIN — LEVETIRACETAM 250 MG: 100 INJECTION, SOLUTION INTRAVENOUS at 21:05

## 2017-01-01 RX ADMIN — PANTOPRAZOLE SODIUM 40 MG: 40 INJECTION, POWDER, FOR SOLUTION INTRAVENOUS at 05:53

## 2017-01-01 RX ADMIN — Medication: at 21:14

## 2017-01-01 RX ADMIN — IPRATROPIUM BROMIDE AND ALBUTEROL SULFATE 3 ML: .5; 3 SOLUTION RESPIRATORY (INHALATION) at 19:38

## 2017-01-01 RX ADMIN — INSULIN HUMAN 3 UNITS: 100 INJECTION, SOLUTION PARENTERAL at 06:29

## 2017-01-01 RX ADMIN — FUROSEMIDE 40 MG: 10 INJECTION, SOLUTION INTRAMUSCULAR; INTRAVENOUS at 08:16

## 2017-01-01 RX ADMIN — RIVASTIGMINE TRANSDERMAL SYSTEM 1 PATCH: 4.6 PATCH, EXTENDED RELEASE TRANSDERMAL at 12:12

## 2017-01-01 RX ADMIN — HEPARIN SODIUM 15 UNITS/KG/HR: 10000 INJECTION, SOLUTION INTRAVENOUS at 11:57

## 2017-01-01 RX ADMIN — HEPARIN SODIUM 13 UNITS/KG/HR: 10000 INJECTION, SOLUTION INTRAVENOUS at 08:47

## 2017-01-01 RX ADMIN — CHLORHEXIDINE GLUCONATE 15 ML: 1.2 RINSE ORAL at 20:10

## 2017-01-01 RX ADMIN — CLOPIDOGREL BISULFATE 75 MG: 75 TABLET, FILM COATED ORAL at 08:02

## 2017-01-01 RX ADMIN — AMOXICILLIN AND CLAVULANATE POTASSIUM 1 TABLET: 875; 125 TABLET, FILM COATED ORAL at 12:11

## 2017-01-01 RX ADMIN — CARVEDILOL 3.12 MG: 3.12 TABLET, FILM COATED ORAL at 08:34

## 2017-01-01 RX ADMIN — LISINOPRIL 2.5 MG: 2.5 TABLET ORAL at 12:01

## 2017-01-01 RX ADMIN — GLYCOPYRROLATE 0.4 MG: 0.2 INJECTION INTRAMUSCULAR; INTRAVENOUS at 06:06

## 2017-01-01 RX ADMIN — IPRATROPIUM BROMIDE AND ALBUTEROL SULFATE 3 ML: .5; 3 SOLUTION RESPIRATORY (INHALATION) at 19:13

## 2017-01-01 RX ADMIN — CLOPIDOGREL BISULFATE 75 MG: 75 TABLET, FILM COATED ORAL at 08:54

## 2017-01-01 RX ADMIN — FUROSEMIDE 40 MG: 10 INJECTION, SOLUTION INTRAMUSCULAR; INTRAVENOUS at 12:11

## 2017-01-01 RX ADMIN — CARVEDILOL 3.12 MG: 3.12 TABLET, FILM COATED ORAL at 12:12

## 2017-01-01 RX ADMIN — ROBINUL 0.4 MG: 0.2 INJECTION INTRAMUSCULAR; INTRAVENOUS at 19:35

## 2017-01-01 RX ADMIN — Medication 1 MG/HR: at 17:07

## 2017-01-01 RX ADMIN — PANTOPRAZOLE SODIUM 40 MG: 40 INJECTION, POWDER, FOR SOLUTION INTRAVENOUS at 06:16

## 2017-01-01 RX ADMIN — INSULIN HUMAN 12 UNITS: 100 INJECTION, SOLUTION PARENTERAL at 17:55

## 2017-01-01 RX ADMIN — Medication: at 21:59

## 2017-01-01 RX ADMIN — HEPARIN SODIUM 5000 UNITS: 5000 INJECTION, SOLUTION INTRAVENOUS; SUBCUTANEOUS at 21:42

## 2017-01-01 RX ADMIN — FUROSEMIDE 40 MG: 10 INJECTION, SOLUTION INTRAMUSCULAR; INTRAVENOUS at 14:05

## 2017-01-01 RX ADMIN — IPRATROPIUM BROMIDE AND ALBUTEROL SULFATE 3 ML: .5; 3 SOLUTION RESPIRATORY (INHALATION) at 01:37

## 2017-01-01 RX ADMIN — VANCOMYCIN HYDROCHLORIDE 1250 MG: 1 INJECTION, POWDER, LYOPHILIZED, FOR SOLUTION INTRAVENOUS at 15:07

## 2017-01-01 RX ADMIN — Medication 250 MG: at 09:45

## 2017-01-01 RX ADMIN — MINERAL OIL AND WHITE PETROLATUM: 150; 830 OINTMENT OPHTHALMIC at 16:30

## 2017-01-01 RX ADMIN — INSULIN HUMAN 8 UNITS: 100 INJECTION, SOLUTION PARENTERAL at 01:04

## 2017-01-01 RX ADMIN — IPRATROPIUM BROMIDE AND ALBUTEROL SULFATE 3 ML: .5; 3 SOLUTION RESPIRATORY (INHALATION) at 01:33

## 2017-01-01 RX ADMIN — CHLORHEXIDINE GLUCONATE 15 ML: 1.2 RINSE ORAL at 22:11

## 2017-01-01 RX ADMIN — AMOXICILLIN AND CLAVULANATE POTASSIUM 1 TABLET: 875; 125 TABLET, FILM COATED ORAL at 08:02

## 2017-01-01 RX ADMIN — CARVEDILOL 3.12 MG: 3.12 TABLET, FILM COATED ORAL at 21:12

## 2017-01-01 RX ADMIN — AMOXICILLIN AND CLAVULANATE POTASSIUM 1 TABLET: 875; 125 TABLET, FILM COATED ORAL at 22:10

## 2017-01-01 RX ADMIN — LISINOPRIL 2.5 MG: 2.5 TABLET ORAL at 08:34

## 2017-01-01 RX ADMIN — TAZOBACTAM SODIUM AND PIPERACILLIN SODIUM 4.5 G: .5; 4 INJECTION, POWDER, LYOPHILIZED, FOR SOLUTION INTRAVENOUS at 04:24

## 2017-01-01 RX ADMIN — Medication: at 11:43

## 2017-01-01 RX ADMIN — CARVEDILOL 3.12 MG: 3.12 TABLET, FILM COATED ORAL at 08:06

## 2017-01-01 RX ADMIN — SPIRONOLACTONE 25 MG: 25 TABLET, FILM COATED ORAL at 08:21

## 2017-01-01 RX ADMIN — GLYCOPYRROLATE 0.4 MG: 0.2 INJECTION INTRAMUSCULAR; INTRAVENOUS at 03:21

## 2017-01-01 RX ADMIN — HEPARIN SODIUM 16 UNITS/KG/HR: 10000 INJECTION, SOLUTION INTRAVENOUS at 14:18

## 2017-01-01 RX ADMIN — Medication: at 17:25

## 2017-01-01 RX ADMIN — Medication 50 MCG/HR: at 10:20

## 2017-01-01 RX ADMIN — MORPHINE SULFATE 2 MG: 2 INJECTION, SOLUTION INTRAMUSCULAR; INTRAVENOUS at 11:18

## 2017-01-01 RX ADMIN — IPRATROPIUM BROMIDE AND ALBUTEROL SULFATE 3 ML: .5; 3 SOLUTION RESPIRATORY (INHALATION) at 20:35

## 2017-01-01 RX ADMIN — CEFTRIAXONE SODIUM 1 G: 1 INJECTION, SOLUTION INTRAVENOUS at 20:18

## 2017-01-01 RX ADMIN — HEPARIN SODIUM 5000 UNITS: 5000 INJECTION, SOLUTION INTRAVENOUS; SUBCUTANEOUS at 15:42

## 2017-01-01 RX ADMIN — CHLORHEXIDINE GLUCONATE 15 ML: 1.2 RINSE ORAL at 20:38

## 2017-01-01 RX ADMIN — IPRATROPIUM BROMIDE AND ALBUTEROL SULFATE 3 ML: .5; 3 SOLUTION RESPIRATORY (INHALATION) at 00:53

## 2017-01-01 RX ADMIN — SPIRONOLACTONE 25 MG: 25 TABLET, FILM COATED ORAL at 08:54

## 2017-01-01 RX ADMIN — Medication: at 07:52

## 2017-01-01 RX ADMIN — GLYCOPYRROLATE 0.4 MG: 0.2 INJECTION, SOLUTION INTRAMUSCULAR; INTRAVENOUS at 00:08

## 2017-01-01 RX ADMIN — IPRATROPIUM BROMIDE AND ALBUTEROL SULFATE 3 ML: .5; 3 SOLUTION RESPIRATORY (INHALATION) at 07:28

## 2017-01-01 RX ADMIN — CLOPIDOGREL BISULFATE 75 MG: 75 TABLET, FILM COATED ORAL at 08:21

## 2017-01-01 RX ADMIN — GLYCOPYRROLATE 0.4 MG: 0.2 INJECTION, SOLUTION INTRAMUSCULAR; INTRAVENOUS at 17:37

## 2017-01-01 RX ADMIN — HEPARIN SODIUM 5000 UNITS: 5000 INJECTION, SOLUTION INTRAVENOUS; SUBCUTANEOUS at 14:28

## 2017-01-01 RX ADMIN — GLYCOPYRROLATE 0.4 MG: 0.2 INJECTION INTRAMUSCULAR; INTRAVENOUS at 18:17

## 2017-01-01 RX ADMIN — ASPIRIN 81 MG: 81 TABLET, CHEWABLE ORAL at 09:46

## 2017-01-01 RX ADMIN — INSULIN DETEMIR 15 UNITS: 100 INJECTION, SOLUTION SUBCUTANEOUS at 21:44

## 2017-01-01 RX ADMIN — CLOPIDOGREL BISULFATE 75 MG: 75 TABLET, FILM COATED ORAL at 09:21

## 2017-01-01 RX ADMIN — IPRATROPIUM BROMIDE AND ALBUTEROL SULFATE 3 ML: .5; 3 SOLUTION RESPIRATORY (INHALATION) at 18:51

## 2017-01-01 RX ADMIN — CARVEDILOL 3.12 MG: 3.12 TABLET, FILM COATED ORAL at 20:52

## 2017-01-01 RX ADMIN — HEPARIN SODIUM 5000 UNITS: 5000 INJECTION, SOLUTION INTRAVENOUS; SUBCUTANEOUS at 05:28

## 2017-01-01 RX ADMIN — INSULIN HUMAN 8 UNITS: 100 INJECTION, SOLUTION PARENTERAL at 06:03

## 2017-01-01 RX ADMIN — Medication 250 MG: at 17:05

## 2017-01-01 RX ADMIN — INSULIN LISPRO 4 UNITS: 100 INJECTION, SOLUTION INTRAVENOUS; SUBCUTANEOUS at 08:36

## 2017-01-01 RX ADMIN — LORAZEPAM 0.5 MG: 2 INJECTION, SOLUTION INTRAMUSCULAR; INTRAVENOUS at 02:51

## 2017-01-01 RX ADMIN — FUROSEMIDE 40 MG: 10 INJECTION, SOLUTION INTRAMUSCULAR; INTRAVENOUS at 17:40

## 2017-01-01 RX ADMIN — FUROSEMIDE 40 MG: 40 TABLET ORAL at 17:06

## 2017-01-01 RX ADMIN — CARVEDILOL 3.12 MG: 3.12 TABLET, FILM COATED ORAL at 12:01

## 2017-01-01 RX ADMIN — LISINOPRIL 2.5 MG: 2.5 TABLET ORAL at 08:21

## 2017-01-01 RX ADMIN — Medication: at 21:03

## 2017-01-01 RX ADMIN — FUROSEMIDE 40 MG: 10 INJECTION, SOLUTION INTRAMUSCULAR; INTRAVENOUS at 08:02

## 2017-01-01 RX ADMIN — Medication: at 08:22

## 2017-01-01 RX ADMIN — NITROGLYCERIN 5 MCG/MIN: 20 INJECTION INTRAVENOUS at 23:25

## 2017-01-01 RX ADMIN — CARVEDILOL 3.12 MG: 3.12 TABLET, FILM COATED ORAL at 08:54

## 2017-01-01 RX ADMIN — IPRATROPIUM BROMIDE AND ALBUTEROL SULFATE 3 ML: .5; 3 SOLUTION RESPIRATORY (INHALATION) at 19:42

## 2017-01-01 RX ADMIN — IPRATROPIUM BROMIDE AND ALBUTEROL SULFATE 3 ML: .5; 3 SOLUTION RESPIRATORY (INHALATION) at 01:07

## 2017-01-01 RX ADMIN — LEVETIRACETAM 250 MG: 100 SOLUTION ORAL at 09:25

## 2017-01-01 RX ADMIN — AMOXICILLIN AND CLAVULANATE POTASSIUM 1 TABLET: 875; 125 TABLET, FILM COATED ORAL at 10:08

## 2017-01-01 RX ADMIN — IPRATROPIUM BROMIDE AND ALBUTEROL SULFATE 3 ML: .5; 3 SOLUTION RESPIRATORY (INHALATION) at 18:53

## 2017-01-01 RX ADMIN — Medication: at 18:14

## 2017-01-01 RX ADMIN — SPIRONOLACTONE 25 MG: 25 TABLET, FILM COATED ORAL at 08:39

## 2017-01-01 RX ADMIN — CLOPIDOGREL BISULFATE 75 MG: 75 TABLET, FILM COATED ORAL at 08:34

## 2017-01-01 RX ADMIN — FUROSEMIDE 40 MG: 10 INJECTION, SOLUTION INTRAMUSCULAR; INTRAVENOUS at 17:17

## 2017-01-01 RX ADMIN — INSULIN HUMAN 10 UNITS: 100 INJECTION, SOLUTION PARENTERAL at 05:33

## 2017-01-01 RX ADMIN — CHLORHEXIDINE GLUCONATE 15 ML: 1.2 RINSE ORAL at 08:19

## 2017-01-01 RX ADMIN — POTASSIUM CHLORIDE 40 MEQ: 1.5 POWDER, FOR SOLUTION ORAL at 08:56

## 2017-01-01 RX ADMIN — CARVEDILOL 3.12 MG: 3.12 TABLET, FILM COATED ORAL at 20:18

## 2017-01-01 RX ADMIN — IPRATROPIUM BROMIDE AND ALBUTEROL SULFATE 3 ML: .5; 3 SOLUTION RESPIRATORY (INHALATION) at 13:02

## 2017-01-01 RX ADMIN — IPRATROPIUM BROMIDE AND ALBUTEROL SULFATE 3 ML: .5; 3 SOLUTION RESPIRATORY (INHALATION) at 00:52

## 2017-01-01 RX ADMIN — Medication: at 20:52

## 2017-01-01 RX ADMIN — VANCOMYCIN HYDROCHLORIDE 1000 MG: 1 INJECTION, SOLUTION INTRAVENOUS at 15:56

## 2017-01-01 RX ADMIN — MORPHINE SULFATE 2 MG: 2 INJECTION, SOLUTION INTRAMUSCULAR; INTRAVENOUS at 08:38

## 2017-01-01 RX ADMIN — LEVETIRACETAM 250 MG: 100 SOLUTION ORAL at 09:19

## 2017-01-01 RX ADMIN — INSULIN HUMAN 3 UNITS: 100 INJECTION, SOLUTION PARENTERAL at 18:43

## 2017-01-01 RX ADMIN — HEPARIN SODIUM 5000 UNITS: 5000 INJECTION, SOLUTION INTRAVENOUS; SUBCUTANEOUS at 12:52

## 2017-01-01 RX ADMIN — FUROSEMIDE 40 MG: 40 TABLET ORAL at 08:34

## 2017-01-01 RX ADMIN — INSULIN LISPRO 3 UNITS: 100 INJECTION, SOLUTION INTRAVENOUS; SUBCUTANEOUS at 17:15

## 2017-01-01 RX ADMIN — HEPARIN SODIUM 5000 UNITS: 5000 INJECTION, SOLUTION INTRAVENOUS; SUBCUTANEOUS at 05:55

## 2017-01-01 RX ADMIN — CARVEDILOL 3.12 MG: 3.12 TABLET, FILM COATED ORAL at 08:17

## 2017-01-01 RX ADMIN — CLOPIDOGREL BISULFATE 75 MG: 75 TABLET, FILM COATED ORAL at 08:17

## 2017-01-01 RX ADMIN — IPRATROPIUM BROMIDE AND ALBUTEROL SULFATE 3 ML: .5; 3 SOLUTION RESPIRATORY (INHALATION) at 00:15

## 2017-01-01 RX ADMIN — LISINOPRIL 2.5 MG: 2.5 TABLET ORAL at 09:19

## 2017-01-01 RX ADMIN — TAZOBACTAM SODIUM AND PIPERACILLIN SODIUM 4.5 G: .5; 4 INJECTION, POWDER, LYOPHILIZED, FOR SOLUTION INTRAVENOUS at 11:34

## 2017-01-01 RX ADMIN — INSULIN HUMAN 8 UNITS: 100 INJECTION, SOLUTION PARENTERAL at 12:22

## 2017-01-01 RX ADMIN — CARVEDILOL 3.12 MG: 3.12 TABLET, FILM COATED ORAL at 08:39

## 2017-01-01 RX ADMIN — SPIRONOLACTONE 25 MG: 25 TABLET, FILM COATED ORAL at 12:11

## 2017-01-01 RX ADMIN — LORAZEPAM 0.5 MG: 2 INJECTION, SOLUTION INTRAMUSCULAR; INTRAVENOUS at 23:31

## 2017-01-01 RX ADMIN — IPRATROPIUM BROMIDE AND ALBUTEROL SULFATE 3 ML: .5; 3 SOLUTION RESPIRATORY (INHALATION) at 12:14

## 2017-01-01 RX ADMIN — ROBINUL 0.4 MG: 0.2 INJECTION INTRAMUSCULAR; INTRAVENOUS at 23:22

## 2017-01-01 RX ADMIN — POTASSIUM CHLORIDE 10 MEQ: 7.46 INJECTION, SOLUTION INTRAVENOUS at 12:01

## 2017-01-01 RX ADMIN — CHLORHEXIDINE GLUCONATE 15 ML: 1.2 RINSE ORAL at 20:19

## 2017-01-01 RX ADMIN — Medication: at 12:23

## 2017-01-01 RX ADMIN — IPRATROPIUM BROMIDE AND ALBUTEROL SULFATE 3 ML: .5; 3 SOLUTION RESPIRATORY (INHALATION) at 07:21

## 2017-01-01 RX ADMIN — RIVASTIGMINE TRANSDERMAL SYSTEM 1 PATCH: 4.6 PATCH, EXTENDED RELEASE TRANSDERMAL at 14:01

## 2017-01-01 RX ADMIN — INSULIN HUMAN 5 UNITS: 100 INJECTION, SOLUTION PARENTERAL at 12:27

## 2017-01-01 RX ADMIN — POTASSIUM CHLORIDE 10 MEQ: 7.46 INJECTION, SOLUTION INTRAVENOUS at 14:14

## 2017-01-01 RX ADMIN — ROBINUL 0.4 MG: 0.2 INJECTION INTRAMUSCULAR; INTRAVENOUS at 15:02

## 2017-01-01 RX ADMIN — IPRATROPIUM BROMIDE AND ALBUTEROL SULFATE 3 ML: .5; 3 SOLUTION RESPIRATORY (INHALATION) at 01:27

## 2017-01-01 RX ADMIN — INSULIN HUMAN 12 UNITS: 100 INJECTION, SOLUTION PARENTERAL at 12:00

## 2017-01-01 RX ADMIN — LEVETIRACETAM 250 MG: 100 INJECTION, SOLUTION INTRAVENOUS at 08:21

## 2017-01-01 RX ADMIN — Medication: at 12:00

## 2017-01-01 RX ADMIN — CLOPIDOGREL BISULFATE 75 MG: 75 TABLET, FILM COATED ORAL at 12:01

## 2017-01-01 RX ADMIN — ASPIRIN 324 MG: 81 TABLET, CHEWABLE ORAL at 02:23

## 2017-01-01 RX ADMIN — ASPIRIN 81 MG: 81 TABLET, COATED ORAL at 12:12

## 2017-01-01 RX ADMIN — ROBINUL 0.4 MG: 0.2 INJECTION INTRAMUSCULAR; INTRAVENOUS at 12:31

## 2017-01-01 RX ADMIN — Medication: at 09:00

## 2017-01-01 RX ADMIN — PRAVASTATIN SODIUM 80 MG: 40 TABLET ORAL at 22:10

## 2017-01-01 RX ADMIN — Medication: at 20:11

## 2017-01-01 RX ADMIN — GLYCOPYRROLATE 0.4 MG: 0.2 INJECTION INTRAMUSCULAR; INTRAVENOUS at 22:51

## 2017-01-01 RX ADMIN — LISINOPRIL 2.5 MG: 2.5 TABLET ORAL at 08:39

## 2017-01-01 RX ADMIN — IPRATROPIUM BROMIDE AND ALBUTEROL SULFATE 3 ML: .5; 3 SOLUTION RESPIRATORY (INHALATION) at 19:18

## 2017-01-01 RX ADMIN — ROBINUL 0.4 MG: 0.2 INJECTION INTRAMUSCULAR; INTRAVENOUS at 09:16

## 2017-01-01 RX ADMIN — INSULIN LISPRO 3 UNITS: 100 INJECTION, SOLUTION INTRAVENOUS; SUBCUTANEOUS at 12:27

## 2017-01-01 RX ADMIN — IPRATROPIUM BROMIDE AND ALBUTEROL SULFATE 3 ML: .5; 3 SOLUTION RESPIRATORY (INHALATION) at 12:06

## 2017-01-01 RX ADMIN — IPRATROPIUM BROMIDE AND ALBUTEROL SULFATE 3 ML: .5; 3 SOLUTION RESPIRATORY (INHALATION) at 00:58

## 2017-01-01 RX ADMIN — LEVETIRACETAM 250 MG: 100 INJECTION, SOLUTION INTRAVENOUS at 20:51

## 2017-01-01 RX ADMIN — SPIRONOLACTONE 25 MG: 25 TABLET, FILM COATED ORAL at 08:02

## 2017-01-01 RX ADMIN — SPIRONOLACTONE 25 MG: 25 TABLET, FILM COATED ORAL at 08:06

## 2017-01-01 RX ADMIN — RIVASTIGMINE TRANSDERMAL SYSTEM 1 PATCH: 4.6 PATCH, EXTENDED RELEASE TRANSDERMAL at 08:24

## 2017-01-01 RX ADMIN — Medication: at 08:59

## 2017-01-01 RX ADMIN — ASPIRIN 81 MG: 81 TABLET, COATED ORAL at 08:54

## 2017-01-01 RX ADMIN — Medication: at 17:12

## 2017-01-01 RX ADMIN — CARVEDILOL 3.12 MG: 3.12 TABLET, FILM COATED ORAL at 21:02

## 2017-01-01 RX ADMIN — TAZOBACTAM SODIUM AND PIPERACILLIN SODIUM 4.5 G: .5; 4 INJECTION, POWDER, LYOPHILIZED, FOR SOLUTION INTRAVENOUS at 04:12

## 2017-01-01 RX ADMIN — LEVETIRACETAM 250 MG: 250 TABLET, FILM COATED ORAL at 21:12

## 2017-01-01 RX ADMIN — BUMETANIDE 2 MG: 0.25 INJECTION, SOLUTION INTRAMUSCULAR; INTRAVENOUS at 00:27

## 2017-01-01 RX ADMIN — HEPARIN SODIUM 5000 UNITS: 5000 INJECTION, SOLUTION INTRAVENOUS; SUBCUTANEOUS at 06:12

## 2017-01-01 RX ADMIN — ROBINUL 0.4 MG: 0.2 INJECTION INTRAMUSCULAR; INTRAVENOUS at 08:49

## 2017-01-01 RX ADMIN — POTASSIUM CHLORIDE 10 MEQ: 7.46 INJECTION, SOLUTION INTRAVENOUS at 08:16

## 2017-01-01 RX ADMIN — Medication 10 ML: at 17:37

## 2017-01-01 RX ADMIN — ASPIRIN 81 MG: 81 TABLET, CHEWABLE ORAL at 08:21

## 2017-01-01 RX ADMIN — FUROSEMIDE 40 MG: 10 INJECTION, SOLUTION INTRAMUSCULAR; INTRAVENOUS at 08:54

## 2017-01-01 RX ADMIN — ASPIRIN 81 MG: 81 TABLET, CHEWABLE ORAL at 08:17

## 2017-01-01 RX ADMIN — INSULIN HUMAN 12 UNITS: 100 INJECTION, SOLUTION PARENTERAL at 23:49

## 2017-01-01 RX ADMIN — IPRATROPIUM BROMIDE AND ALBUTEROL SULFATE 3 ML: .5; 3 SOLUTION RESPIRATORY (INHALATION) at 12:53

## 2017-01-01 RX ADMIN — Medication: at 17:05

## 2017-01-01 RX ADMIN — ROBINUL 0.4 MG: 0.2 INJECTION INTRAMUSCULAR; INTRAVENOUS at 16:15

## 2017-01-01 RX ADMIN — PRAVASTATIN SODIUM 80 MG: 40 TABLET ORAL at 20:09

## 2017-01-01 RX ADMIN — Medication: at 12:59

## 2017-01-01 RX ADMIN — LISINOPRIL 2.5 MG: 2.5 TABLET ORAL at 08:17

## 2017-01-01 RX ADMIN — LEVETIRACETAM 250 MG: 100 INJECTION, SOLUTION INTRAVENOUS at 08:01

## 2017-01-01 RX ADMIN — IPRATROPIUM BROMIDE AND ALBUTEROL SULFATE 3 ML: .5; 3 SOLUTION RESPIRATORY (INHALATION) at 12:48

## 2017-01-01 RX ADMIN — FUROSEMIDE 40 MG: 40 TABLET ORAL at 08:21

## 2017-01-01 RX ADMIN — PRAVASTATIN SODIUM 80 MG: 40 TABLET ORAL at 21:05

## 2017-01-01 RX ADMIN — CHLORHEXIDINE GLUCONATE 15 ML: 1.2 RINSE ORAL at 12:12

## 2017-01-01 RX ADMIN — FUROSEMIDE 40 MG: 40 TABLET ORAL at 18:28

## 2017-01-01 RX ADMIN — CEFTRIAXONE SODIUM 1 G: 1 INJECTION, SOLUTION INTRAVENOUS at 20:37

## 2017-01-01 RX ADMIN — INSULIN HUMAN 3 UNITS: 100 INJECTION, SOLUTION PARENTERAL at 23:41

## 2017-01-01 RX ADMIN — FUROSEMIDE 40 MG: 10 INJECTION, SOLUTION INTRAMUSCULAR; INTRAVENOUS at 18:26

## 2017-01-01 RX ADMIN — MINERAL OIL AND WHITE PETROLATUM: 150; 830 OINTMENT OPHTHALMIC at 21:59

## 2017-01-01 RX ADMIN — ASPIRIN 81 MG: 81 TABLET, COATED ORAL at 12:01

## 2017-01-01 RX ADMIN — PRAVASTATIN SODIUM 80 MG: 40 TABLET ORAL at 22:34

## 2017-01-01 RX ADMIN — INSULIN HUMAN 10 UNITS: 100 INJECTION, SOLUTION PARENTERAL at 18:16

## 2017-01-01 RX ADMIN — ROBINUL 0.4 MG: 0.2 INJECTION INTRAMUSCULAR; INTRAVENOUS at 03:36

## 2017-01-01 RX ADMIN — ASPIRIN 81 MG: 81 TABLET, CHEWABLE ORAL at 08:02

## 2017-01-01 RX ADMIN — ASPIRIN 81 MG: 81 TABLET, CHEWABLE ORAL at 09:21

## 2017-01-01 RX ADMIN — TAZOBACTAM SODIUM AND PIPERACILLIN SODIUM 4.5 G: .5; 4 INJECTION, POWDER, LYOPHILIZED, FOR SOLUTION INTRAVENOUS at 16:47

## 2017-01-01 RX ADMIN — CHLORHEXIDINE GLUCONATE 15 ML: 1.2 RINSE ORAL at 21:07

## 2017-01-01 RX ADMIN — PANTOPRAZOLE SODIUM 40 MG: 40 INJECTION, POWDER, FOR SOLUTION INTRAVENOUS at 05:38

## 2017-01-01 RX ADMIN — INSULIN DETEMIR 10 UNITS: 100 INJECTION, SOLUTION SUBCUTANEOUS at 21:00

## 2017-01-01 RX ADMIN — Medication: at 20:57

## 2017-01-01 RX ADMIN — HEPARIN SODIUM 5000 UNITS: 5000 INJECTION, SOLUTION INTRAVENOUS; SUBCUTANEOUS at 13:18

## 2017-01-01 RX ADMIN — PANTOPRAZOLE SODIUM 40 MG: 40 INJECTION, POWDER, FOR SOLUTION INTRAVENOUS at 06:29

## 2017-01-01 RX ADMIN — INSULIN HUMAN 8 UNITS: 100 INJECTION, SOLUTION PARENTERAL at 05:55

## 2017-01-01 RX ADMIN — PANTOPRAZOLE SODIUM 40 MG: 40 INJECTION, POWDER, FOR SOLUTION INTRAVENOUS at 07:01

## 2017-01-01 RX ADMIN — MORPHINE SULFATE 1 MG: 2 INJECTION, SOLUTION INTRAMUSCULAR; INTRAVENOUS at 11:54

## 2017-01-01 RX ADMIN — IPRATROPIUM BROMIDE AND ALBUTEROL SULFATE 3 ML: .5; 3 SOLUTION RESPIRATORY (INHALATION) at 07:38

## 2017-01-01 RX ADMIN — Medication 250 MG: at 09:25

## 2017-01-01 RX ADMIN — HEPARIN SODIUM 5000 UNITS: 5000 INJECTION, SOLUTION INTRAVENOUS; SUBCUTANEOUS at 22:46

## 2017-01-01 RX ADMIN — FUROSEMIDE 40 MG: 10 INJECTION, SOLUTION INTRAMUSCULAR; INTRAVENOUS at 17:09

## 2017-01-01 RX ADMIN — LEVETIRACETAM 250 MG: 100 SOLUTION ORAL at 23:18

## 2017-01-01 RX ADMIN — FUROSEMIDE 40 MG: 40 TABLET ORAL at 17:30

## 2017-01-01 RX ADMIN — SPIRONOLACTONE 25 MG: 25 TABLET, FILM COATED ORAL at 12:01

## 2017-01-01 RX ADMIN — FUROSEMIDE 40 MG: 40 TABLET ORAL at 09:21

## 2017-01-01 RX ADMIN — INSULIN HUMAN 8 UNITS: 100 INJECTION, SOLUTION PARENTERAL at 12:35

## 2017-01-01 RX ADMIN — CEFTRIAXONE SODIUM 1 G: 1 INJECTION, SOLUTION INTRAVENOUS at 22:33

## 2017-01-01 RX ADMIN — POTASSIUM CHLORIDE 40 MEQ: 1.5 POWDER, FOR SOLUTION ORAL at 13:50

## 2017-01-01 RX ADMIN — IPRATROPIUM BROMIDE AND ALBUTEROL SULFATE 3 ML: .5; 3 SOLUTION RESPIRATORY (INHALATION) at 06:58

## 2017-01-01 RX ADMIN — Medication 250 MG: at 17:12

## 2017-01-01 RX ADMIN — HEPARIN SODIUM 5000 UNITS: 5000 INJECTION, SOLUTION INTRAVENOUS; SUBCUTANEOUS at 21:02

## 2017-01-01 RX ADMIN — CLOPIDOGREL BISULFATE 75 MG: 75 TABLET, FILM COATED ORAL at 08:07

## 2017-01-01 RX ADMIN — MORPHINE SULFATE 2 MG: 2 INJECTION, SOLUTION INTRAMUSCULAR; INTRAVENOUS at 16:15

## 2017-01-01 RX ADMIN — LISINOPRIL 2.5 MG: 2.5 TABLET ORAL at 09:45

## 2017-01-01 RX ADMIN — IPRATROPIUM BROMIDE AND ALBUTEROL SULFATE 3 ML: .5; 3 SOLUTION RESPIRATORY (INHALATION) at 12:42

## 2017-01-01 RX ADMIN — Medication 5 ML: at 11:55

## 2017-01-01 RX ADMIN — GLYCOPYRROLATE 0.4 MG: 0.2 INJECTION, SOLUTION INTRAMUSCULAR; INTRAVENOUS at 08:37

## 2017-01-01 RX ADMIN — Medication: at 08:17

## 2017-01-01 RX ADMIN — Medication: at 13:38

## 2017-01-01 RX ADMIN — HEPARIN SODIUM 5000 UNITS: 5000 INJECTION, SOLUTION INTRAVENOUS; SUBCUTANEOUS at 05:53

## 2017-01-01 RX ADMIN — MINERAL OIL AND WHITE PETROLATUM: 150; 830 OINTMENT OPHTHALMIC at 13:10

## 2017-01-01 RX ADMIN — HEPARIN SODIUM 5000 UNITS: 5000 INJECTION, SOLUTION INTRAVENOUS; SUBCUTANEOUS at 05:54

## 2017-01-01 RX ADMIN — IPRATROPIUM BROMIDE AND ALBUTEROL SULFATE 3 ML: .5; 3 SOLUTION RESPIRATORY (INHALATION) at 07:08

## 2017-01-01 RX ADMIN — Medication: at 12:12

## 2017-01-01 RX ADMIN — ROBINUL 0.4 MG: 0.2 INJECTION INTRAMUSCULAR; INTRAVENOUS at 00:18

## 2017-01-01 RX ADMIN — CLOPIDOGREL BISULFATE 75 MG: 75 TABLET, FILM COATED ORAL at 09:47

## 2017-01-01 RX ADMIN — PANTOPRAZOLE SODIUM 40 MG: 40 INJECTION, POWDER, FOR SOLUTION INTRAVENOUS at 06:10

## 2017-01-01 RX ADMIN — VANCOMYCIN HYDROCHLORIDE 1000 MG: 1 INJECTION, SOLUTION INTRAVENOUS at 15:43

## 2017-01-01 RX ADMIN — CEFTRIAXONE SODIUM 1 G: 1 INJECTION, SOLUTION INTRAVENOUS at 02:23

## 2017-01-01 RX ADMIN — ASPIRIN 81 MG: 81 TABLET, CHEWABLE ORAL at 08:34

## 2017-01-01 RX ADMIN — INSULIN HUMAN 3 UNITS: 100 INJECTION, SOLUTION PARENTERAL at 12:07

## 2017-01-01 RX ADMIN — IPRATROPIUM BROMIDE AND ALBUTEROL SULFATE 3 ML: .5; 3 SOLUTION RESPIRATORY (INHALATION) at 07:20

## 2017-01-01 RX ADMIN — Medication: at 21:42

## 2017-01-01 RX ADMIN — INSULIN DETEMIR 10 UNITS: 100 INJECTION, SOLUTION SUBCUTANEOUS at 15:08

## 2017-01-01 RX ADMIN — HEPARIN SODIUM 16 UNITS/KG/HR: 10000 INJECTION, SOLUTION INTRAVENOUS at 14:25

## 2017-01-01 RX ADMIN — Medication: at 17:36

## 2017-01-01 RX ADMIN — LEVETIRACETAM 250 MG: 100 SOLUTION ORAL at 21:13

## 2017-01-01 RX ADMIN — POTASSIUM CHLORIDE 10 MEQ: 7.46 INJECTION, SOLUTION INTRAVENOUS at 10:16

## 2017-01-01 RX ADMIN — HEPARIN SODIUM 5000 UNITS: 5000 INJECTION, SOLUTION INTRAVENOUS; SUBCUTANEOUS at 13:30

## 2017-01-01 RX ADMIN — Medication: at 19:35

## 2017-01-01 RX ADMIN — FUROSEMIDE 40 MG: 40 TABLET ORAL at 17:24

## 2017-01-01 RX ADMIN — IPRATROPIUM BROMIDE AND ALBUTEROL SULFATE 3 ML: .5; 3 SOLUTION RESPIRATORY (INHALATION) at 00:03

## 2017-01-01 RX ADMIN — PRAVASTATIN SODIUM 80 MG: 40 TABLET ORAL at 20:18

## 2017-01-01 RX ADMIN — LORAZEPAM 0.5 MG: 2 INJECTION, SOLUTION INTRAMUSCULAR; INTRAVENOUS at 08:37

## 2017-01-01 RX ADMIN — INSULIN HUMAN 4 UNITS: 100 INJECTION, SOLUTION PARENTERAL at 00:34

## 2017-01-01 RX ADMIN — ROBINUL 0.4 MG: 0.2 INJECTION INTRAMUSCULAR; INTRAVENOUS at 12:04

## 2017-01-01 RX ADMIN — IPRATROPIUM BROMIDE AND ALBUTEROL SULFATE 3 ML: .5; 3 SOLUTION RESPIRATORY (INHALATION) at 19:40

## 2017-01-01 RX ADMIN — TAZOBACTAM SODIUM AND PIPERACILLIN SODIUM 4.5 G: .5; 4 INJECTION, POWDER, LYOPHILIZED, FOR SOLUTION INTRAVENOUS at 15:21

## 2017-01-01 RX ADMIN — HEPARIN SODIUM 5000 UNITS: 5000 INJECTION, SOLUTION INTRAVENOUS; SUBCUTANEOUS at 06:36

## 2017-01-01 RX ADMIN — HEPARIN SODIUM 5000 UNITS: 5000 INJECTION, SOLUTION INTRAVENOUS; SUBCUTANEOUS at 21:05

## 2017-01-01 RX ADMIN — CHLORHEXIDINE GLUCONATE 15 ML: 1.2 RINSE ORAL at 20:52

## 2017-01-01 RX ADMIN — TAZOBACTAM SODIUM AND PIPERACILLIN SODIUM 4.5 G: .5; 4 INJECTION, POWDER, LYOPHILIZED, FOR SOLUTION INTRAVENOUS at 23:09

## 2017-01-01 RX ADMIN — CHLORHEXIDINE GLUCONATE 15 ML: 1.2 RINSE ORAL at 08:03

## 2017-01-01 RX ADMIN — Medication: at 03:36

## 2017-01-01 RX ADMIN — MINERAL OIL AND WHITE PETROLATUM: 150; 830 OINTMENT OPHTHALMIC at 08:42

## 2017-01-01 RX ADMIN — CEFTRIAXONE SODIUM 1 G: 1 INJECTION, SOLUTION INTRAVENOUS at 20:09

## 2017-01-01 RX ADMIN — MORPHINE SULFATE 2 MG: 2 INJECTION, SOLUTION INTRAMUSCULAR; INTRAVENOUS at 18:18

## 2017-01-01 RX ADMIN — Medication 250 MG: at 08:34

## 2017-01-01 RX ADMIN — AMOXICILLIN AND CLAVULANATE POTASSIUM 1 TABLET: 875; 125 TABLET, FILM COATED ORAL at 21:05

## 2017-01-01 RX ADMIN — IPRATROPIUM BROMIDE AND ALBUTEROL SULFATE 3 ML: .5; 3 SOLUTION RESPIRATORY (INHALATION) at 13:16

## 2017-01-01 RX ADMIN — HEPARIN SODIUM 12 UNITS/KG/HR: 10000 INJECTION, SOLUTION INTRAVENOUS at 01:22

## 2017-01-01 RX ADMIN — LEVETIRACETAM 250 MG: 250 TABLET, FILM COATED ORAL at 08:35

## 2017-01-01 RX ADMIN — FUROSEMIDE 40 MG: 40 TABLET ORAL at 08:39

## 2017-01-01 RX ADMIN — CARVEDILOL 3.12 MG: 3.12 TABLET, FILM COATED ORAL at 09:46

## 2017-01-01 RX ADMIN — IPRATROPIUM BROMIDE AND ALBUTEROL SULFATE 3 ML: .5; 3 SOLUTION RESPIRATORY (INHALATION) at 12:28

## 2017-01-01 RX ADMIN — CARVEDILOL 3.12 MG: 3.12 TABLET, FILM COATED ORAL at 09:20

## 2017-01-01 RX ADMIN — INSULIN HUMAN 5 UNITS: 100 INJECTION, SOLUTION PARENTERAL at 12:02

## 2017-01-01 RX ADMIN — IPRATROPIUM BROMIDE AND ALBUTEROL SULFATE 3 ML: .5; 3 SOLUTION RESPIRATORY (INHALATION) at 01:29

## 2017-01-01 RX ADMIN — LEVETIRACETAM 1500 MG: 15 INJECTION INTRAVENOUS at 12:10

## 2017-01-01 RX ADMIN — HEPARIN SODIUM 5000 UNITS: 5000 INJECTION, SOLUTION INTRAVENOUS; SUBCUTANEOUS at 21:12

## 2017-01-01 RX ADMIN — CHLORHEXIDINE GLUCONATE 15 ML: 1.2 RINSE ORAL at 08:12

## 2017-01-01 RX ADMIN — INSULIN HUMAN 5 UNITS: 100 INJECTION, SOLUTION PARENTERAL at 05:54

## 2017-01-01 RX ADMIN — HEPARIN SODIUM 5000 UNITS: 5000 INJECTION, SOLUTION INTRAVENOUS; SUBCUTANEOUS at 15:23

## 2017-01-01 RX ADMIN — FUROSEMIDE 40 MG: 40 TABLET ORAL at 09:47

## 2017-01-01 RX ADMIN — ROBINUL 0.4 MG: 0.2 INJECTION INTRAMUSCULAR; INTRAVENOUS at 15:47

## 2017-01-01 RX ADMIN — IPRATROPIUM BROMIDE AND ALBUTEROL SULFATE 3 ML: .5; 3 SOLUTION RESPIRATORY (INHALATION) at 06:48

## 2017-01-01 RX ADMIN — PRAVASTATIN SODIUM 80 MG: 40 TABLET ORAL at 20:52

## 2017-01-01 RX ADMIN — PRAVASTATIN SODIUM 80 MG: 40 TABLET ORAL at 02:30

## 2017-01-01 RX ADMIN — ROBINUL 0.4 MG: 0.2 INJECTION INTRAMUSCULAR; INTRAVENOUS at 04:00

## 2017-01-01 RX ADMIN — Medication 250 MG: at 09:19

## 2017-01-01 RX ADMIN — LEVETIRACETAM 250 MG: 100 SOLUTION ORAL at 09:39

## 2017-01-01 RX ADMIN — DEXMEDETOMIDINE HYDROCHLORIDE 0.2 MCG/KG/HR: 4 INJECTION, SOLUTION INTRAVENOUS at 00:01

## 2017-01-01 RX ADMIN — LEVETIRACETAM 250 MG: 100 SOLUTION ORAL at 21:42

## 2017-01-01 RX ADMIN — AMOXICILLIN AND CLAVULANATE POTASSIUM 1 TABLET: 875; 125 TABLET, FILM COATED ORAL at 20:51

## 2017-01-01 RX ADMIN — Medication 50 MCG/HR: at 17:07

## 2017-01-01 RX ADMIN — Medication: at 17:59

## 2017-01-01 RX ADMIN — LEVETIRACETAM 250 MG: 250 TABLET, FILM COATED ORAL at 21:02

## 2017-01-01 RX ADMIN — INSULIN HUMAN 5 UNITS: 100 INJECTION, SOLUTION PARENTERAL at 17:40

## 2017-01-01 RX ADMIN — TAZOBACTAM SODIUM AND PIPERACILLIN SODIUM 4.5 G: .5; 4 INJECTION, POWDER, LYOPHILIZED, FOR SOLUTION INTRAVENOUS at 09:44

## 2017-01-01 RX ADMIN — GLYCOPYRROLATE 0.4 MG: 0.2 INJECTION INTRAMUSCULAR; INTRAVENOUS at 11:43

## 2017-01-01 RX ADMIN — GLYCOPYRROLATE 0.4 MG: 0.2 INJECTION INTRAMUSCULAR; INTRAVENOUS at 13:40

## 2017-01-01 RX ADMIN — MORPHINE SULFATE 1 MG: 2 INJECTION, SOLUTION INTRAMUSCULAR; INTRAVENOUS at 17:36

## 2017-01-01 RX ADMIN — HEPARIN SODIUM 5000 UNITS: 5000 INJECTION, SOLUTION INTRAVENOUS; SUBCUTANEOUS at 23:09

## 2017-01-01 RX ADMIN — CHLORHEXIDINE GLUCONATE 15 ML: 1.2 RINSE ORAL at 08:55

## 2017-01-01 RX ADMIN — CHLORHEXIDINE GLUCONATE 15 ML: 1.2 RINSE ORAL at 22:37

## 2017-01-01 RX ADMIN — ROBINUL 0.4 MG: 0.2 INJECTION INTRAMUSCULAR; INTRAVENOUS at 08:52

## 2017-01-01 RX ADMIN — ROBINUL 0.4 MG: 0.2 INJECTION INTRAMUSCULAR; INTRAVENOUS at 06:02

## 2017-01-01 RX ADMIN — LEVETIRACETAM 250 MG: 250 TABLET, FILM COATED ORAL at 08:17

## 2017-01-01 RX ADMIN — ROBINUL 0.4 MG: 0.2 INJECTION INTRAMUSCULAR; INTRAVENOUS at 01:58

## 2017-01-01 RX ADMIN — Medication 5 ML: at 08:49

## 2017-01-01 RX ADMIN — ACETAMINOPHEN 650 MG: 325 TABLET, FILM COATED ORAL at 13:18

## 2017-01-01 RX ADMIN — IPRATROPIUM BROMIDE AND ALBUTEROL SULFATE 3 ML: .5; 3 SOLUTION RESPIRATORY (INHALATION) at 07:26

## 2017-01-01 RX ADMIN — IPRATROPIUM BROMIDE AND ALBUTEROL SULFATE 3 ML: .5; 3 SOLUTION RESPIRATORY (INHALATION) at 12:41

## 2017-01-01 RX ADMIN — Medication: at 08:42

## 2017-01-01 RX ADMIN — CARVEDILOL 3.12 MG: 3.12 TABLET, FILM COATED ORAL at 23:48

## 2017-01-01 RX ADMIN — HEPARIN SODIUM 4000 UNITS: 1000 INJECTION, SOLUTION INTRAVENOUS; SUBCUTANEOUS at 01:22

## 2017-01-01 RX ADMIN — IPRATROPIUM BROMIDE AND ALBUTEROL SULFATE 3 ML: .5; 3 SOLUTION RESPIRATORY (INHALATION) at 08:31

## 2017-01-01 RX ADMIN — TAZOBACTAM SODIUM AND PIPERACILLIN SODIUM 4.5 G: .5; 4 INJECTION, POWDER, LYOPHILIZED, FOR SOLUTION INTRAVENOUS at 21:02

## 2017-01-01 RX ADMIN — CARVEDILOL 3.12 MG: 3.12 TABLET, FILM COATED ORAL at 22:10

## 2017-01-01 RX ADMIN — MORPHINE SULFATE 1 MG: 2 INJECTION, SOLUTION INTRAMUSCULAR; INTRAVENOUS at 06:23

## 2017-01-01 RX ADMIN — FUROSEMIDE 40 MG: 10 INJECTION, SOLUTION INTRAMUSCULAR; INTRAVENOUS at 08:06

## 2017-01-01 RX ADMIN — IPRATROPIUM BROMIDE AND ALBUTEROL SULFATE 3 ML: .5; 3 SOLUTION RESPIRATORY (INHALATION) at 18:56

## 2017-01-01 RX ADMIN — FUROSEMIDE 40 MG: 10 INJECTION, SOLUTION INTRAMUSCULAR; INTRAVENOUS at 17:07

## 2017-01-01 RX ADMIN — CARVEDILOL 3.12 MG: 3.12 TABLET, FILM COATED ORAL at 08:21

## 2017-01-01 RX ADMIN — TAZOBACTAM SODIUM AND PIPERACILLIN SODIUM 4.5 G: .5; 4 INJECTION, POWDER, LYOPHILIZED, FOR SOLUTION INTRAVENOUS at 09:18

## 2017-01-01 RX ADMIN — ROBINUL 0.4 MG: 0.2 INJECTION INTRAMUSCULAR; INTRAVENOUS at 20:42

## 2017-01-01 RX ADMIN — Medication: at 23:22

## 2017-01-01 RX ADMIN — INSULIN HUMAN 5 UNITS: 100 INJECTION, SOLUTION PARENTERAL at 23:17

## 2017-01-01 RX ADMIN — INSULIN HUMAN 3 UNITS: 100 INJECTION, SOLUTION PARENTERAL at 07:02

## 2017-01-01 RX ADMIN — SULFUR HEXAFLUORIDE 2 ML: KIT at 10:20

## 2017-01-01 RX ADMIN — Medication: at 18:45

## 2017-01-01 RX ADMIN — IPRATROPIUM BROMIDE AND ALBUTEROL SULFATE 3 ML: .5; 3 SOLUTION RESPIRATORY (INHALATION) at 23:58

## 2017-01-01 RX ADMIN — LISINOPRIL 2.5 MG: 2.5 TABLET ORAL at 11:56

## 2017-01-01 RX ADMIN — INSULIN HUMAN 5 UNITS: 100 INJECTION, SOLUTION PARENTERAL at 01:03

## 2017-01-01 RX ADMIN — FUROSEMIDE 40 MG: 40 TABLET ORAL at 08:17

## 2017-01-01 RX ADMIN — INSULIN LISPRO 2 UNITS: 100 INJECTION, SOLUTION INTRAVENOUS; SUBCUTANEOUS at 21:03

## 2017-01-01 RX ADMIN — PANTOPRAZOLE SODIUM 40 MG: 40 INJECTION, POWDER, FOR SOLUTION INTRAVENOUS at 06:12

## 2017-01-01 RX ADMIN — CLOPIDOGREL BISULFATE 75 MG: 75 TABLET, FILM COATED ORAL at 12:11

## 2017-01-01 RX ADMIN — Medication: at 17:31

## 2017-01-01 RX ADMIN — Medication 100 MCG/HR: at 04:01

## 2017-01-01 RX ADMIN — MORPHINE SULFATE 1 MG: 2 INJECTION, SOLUTION INTRAMUSCULAR; INTRAVENOUS at 11:17

## 2017-01-01 RX ADMIN — INSULIN DETEMIR 15 UNITS: 100 INJECTION, SOLUTION SUBCUTANEOUS at 11:19

## 2017-01-01 RX ADMIN — TAZOBACTAM SODIUM AND PIPERACILLIN SODIUM 4.5 G: .5; 4 INJECTION, POWDER, LYOPHILIZED, FOR SOLUTION INTRAVENOUS at 17:24

## 2017-01-01 RX ADMIN — Medication: at 14:42

## 2017-03-24 PROBLEM — J96.00 ACUTE RESPIRATORY FAILURE (HCC): Status: ACTIVE | Noted: 2017-01-01

## 2017-03-24 PROBLEM — J81.1 PULMONARY EDEMA: Status: ACTIVE | Noted: 2017-01-01

## 2017-03-24 PROBLEM — Z91.199 MEDICALLY NONCOMPLIANT: Status: ACTIVE | Noted: 2017-01-01

## 2017-03-24 PROBLEM — I50.9 CONGESTIVE HEART FAILURE (CHF) (HCC): Status: ACTIVE | Noted: 2017-01-01

## 2017-03-24 PROBLEM — I50.9 CHF EXACERBATION (HCC): Status: ACTIVE | Noted: 2017-01-01

## 2017-03-25 PROBLEM — N17.9 AKI (ACUTE KIDNEY INJURY) (HCC): Status: ACTIVE | Noted: 2017-01-01

## 2017-03-25 PROBLEM — J44.9 COPD (CHRONIC OBSTRUCTIVE PULMONARY DISEASE) (HCC): Status: ACTIVE | Noted: 2017-01-01

## 2017-03-25 PROBLEM — I21.4 NSTEMI (NON-ST ELEVATED MYOCARDIAL INFARCTION) (HCC): Status: ACTIVE | Noted: 2017-01-01

## 2017-03-25 PROBLEM — E78.5 HLD (HYPERLIPIDEMIA): Status: ACTIVE | Noted: 2017-01-01

## 2017-03-28 PROBLEM — R13.12 OROPHARYNGEAL DYSPHAGIA: Status: ACTIVE | Noted: 2017-01-01

## 2017-03-28 PROBLEM — G93.40 ENCEPHALOPATHY: Status: ACTIVE | Noted: 2017-01-01

## 2017-03-28 PROBLEM — F03.90 DEMENTIA (HCC): Status: ACTIVE | Noted: 2017-01-01

## 2017-04-01 NOTE — THERAPY DISCHARGE NOTE
Acute Care - Occupational Therapy Discharge Summary  [facility]     Patient Name: Raymond Blanco  : 1940  MRN: [id number]    Today's Date: 2017  Onset of Illness/Injury or Date of Surgery Date: 17    Date of Referral to OT: 17  Referring Physician: MD Daniela      Admit Date: 3/24/2017        OT Recommendation and Plan    Visit Dx:    ICD-10-CM ICD-9-CM   1. Dysphagia, unspecified type R13.10 787.20   2. Impaired mobility and ADLs Z74.09 799.89   3. Impaired functional mobility, balance, gait, and endurance Z74.09 V49.89   4. Acute respiratory failure with hypoxia J96.01 518.81                     OT Goals       17 1450 17 1307       Bed Mobility OT LTG    Bed Mobility OT LTG, Date Established  17  -CL     Bed Mobility OT LTG, Time to Achieve  by discharge  -CL     Bed Mobility OT LTG, Activity Type  supine to sit/sit to supine  -CL     Bed Mobility OT LTG, [place] Level  maximum assist (25% patient effort);2 person assist required  -CL     Bed Mobility OT LTG, Additional Goal  AAD  -CL     Bed Mobility OT LTG, Outcome goal no longer appropriate  -MC      Bed Mobility OT LTG, Reason Goal Not Met unable to make needed progress;medical status inhibits participation  -MC      Strength OT LTG    Strength Goal OT LTG, Date Established  17  -CL     Strength Goal OT LTG, Time to Achieve  by discharge  -CL     Strength Goal OT LTG, Functional Goal  Pt will tolerate BUE AAROM/PROM HEP x10 reps to increase strength/endurance/ROM to promote ADL performance.   -CL     Strength Goal OT LTG, Outcome goal no longer appropriate  -MC      Strength Goal OT LTG, Reason Goal Not Met medical status inhibits participation;unable to make needed progress  -MC      Static Sitting Balance OT LTG    Static Sitting Balance OT LTG, Date Established  17  -CL     Static Sitting Balance OT LTG, Time to Achieve  by discharge  -CL     Static Sitting Balance OT LTG, [place] Level   moderate assist (50% patient effort)  -CL     Static Sitting Balance OT LTG, Additional Goal  AAD  -CL     Static Sitting Balance OT LTG, Outcome goal no longer appropriate  -MC      Static Sitting Balance OT LTG, Reason Goal Not Met unable to make needed progress;medical status inhibits participation  -MC      Follow Directions OT LTG    Follow Directions OT LTG, Date Established  03/29/17  -CL     Follow Directions OT LTG, Time to Achieve  by discharge  -CL     Follow Directions OT LTG, Activity Type  1-Step;50% treatment session  -CL     Follow Directions OT LTG, [place] Level  with verbal cues;demonstration  -CL     Follow Directions OT LTG, Additional Goal  w/ MAX VCs  -CL     Follow Directions OT LTG, Outcome goal no longer appropriate  -MC      Follow Directions OT LTG, Reason Goal Not Met unable to make needed progress;medical status inhibits participation  -MC        User Key  (r) = Recorded By, (t) = Taken By, (c) = Cosigned By    Initials Name Provider Type    MC Maira Borrego, OT Occupational Therapist    CL Lacey Woods, OT Occupational Therapist                  OT Discharge Summary  Anticipated Discharge Disposition: skilled nursing facility  Reason for Discharge: Patient/Caregiver request  Outcomes Achieved: Refer to plan of care for updates on goals achieved  Discharge Destination: SNF      Maira Borrego OT  4/1/2017

## 2017-04-01 NOTE — PLAN OF CARE
Problem: Patient Care Overview (Adult)  Goal: Plan of Care Review  Outcome: Ongoing (interventions implemented as appropriate)    04/01/17 0526   Coping/Psychosocial Response Interventions   Plan Of Care Reviewed With patient   Patient Care Overview   Progress no change   Outcome Evaluation   Outcome Summary/Follow up Plan pt was able to blink eyes to commans and would move lef in order to get his foot out of his boot. BP on the low side at times but rebounded. VSS.        Goal: Adult Individualization and Mutuality  Outcome: Ongoing (interventions implemented as appropriate)  Goal: Discharge Needs Assessment  Outcome: Ongoing (interventions implemented as appropriate)    Problem: Cardiac: Heart Failure (Adult)  Goal: Signs and Symptoms of Listed Potential Problems Will be Absent or Manageable (Cardiac: Heart Failure)  Outcome: Ongoing (interventions implemented as appropriate)    Problem: Pressure Ulcer (Adult)  Goal: Signs and Symptoms of Listed Potential Problems Will be Absent or Manageable (Pressure Ulcer)  Outcome: Ongoing (interventions implemented as appropriate)

## 2017-04-01 NOTE — PROGRESS NOTES
"INTENSIVIST NOTE     Hospital Day: 8      Mr. Raymond Blanco, 76 y.o. male is followed for:   Acute respiratory failure       SUBJECTIVE     76-year-old male with a history of CAD, prior CABG, and COPD was transferred from [facility] on 3/24 after being found down by family on his porch.  He had hypoxemic respiratory failure and pulmonary edema.  Initial troponins were mildly elevated but after transfer these increased markedly possibly related to contusion of one of his bypass grafts.  Unfortunately, he has overall improved with the exception of severe hypoxic encephalopathy felt by neurology to have a poor prognosis.      Interval history:    Remains poorly responsive to stimuli.  No real change neurologically.    The patient's relevant past medical, surgical and social history were reviewed and updated in Epic as appropriate.       OBJECTIVE     Vital Sign Min/Max for last 24 hours  Temp  Min: 97.4 °F (36.3 °C)  Max: 97.9 °F (36.6 °C)   BP  Min: 83/43  Max: 119/67   Pulse  Min: 68  Max: 87   Resp  Min: 18  Max: 24   SpO2  Min: 93 %  Max: 98 %   Flow (L/min)  Min: 2  Max: 2   No Data Recorded      Intake/Output Summary (Last 24 hours) at 04/01/17 1616  Last data filed at 04/01/17 1341   Gross per 24 hour   Intake           2549.2 ml   Output             1490 ml   Net           1059.2 ml      Flowsheet Rows         First Filed Value    Admission Height  66\" (167.6 cm) Documented at 03/24/2017 2249    Admission Weight  153 lb 9.6 oz (69.7 kg) Documented at 03/24/2017 2249         Last 3 weights    03/24/17  2249 03/28/17  0600 03/31/17  0400   Weight: 153 lb 9.6 oz (69.7 kg) 152 lb 12.5 oz (69.3 kg) 146 lb 14.4 oz (66.6 kg)            Objective:  General Appearance:  In no acute distress.    Vital signs: (most recent): Blood pressure 111/63, pulse 68, temperature 97.4 °F (36.3 °C), temperature source Axillary, resp. rate 20, height 66\" (167.6 cm), weight 146 lb 14.4 oz (66.6 kg), SpO2 96 %.    HEENT: " Normal HEENT exam.    Lungs:  Normal respiratory rate and normal effort.  He is not in respiratory distress.  There are rhonchi.  No wheezes or rales.    Heart: Normal rate.  Regular rhythm.  S1 normal and S2 normal.  No murmur, gallop or friction rub.   Chest: Symmetric chest wall expansion.   Abdomen: Abdomen is soft and non-distended.  Bowel sounds are normal.   There is no abdominal tenderness.   There is no mass. There is no splenomegaly. There is no hepatomegaly.   Extremities: There is no deformity or dependent edema.    Neurological: (Poorly responsive).    Pupils:  Pupils are equal, round, and reactive to light.    Skin:  Warm and dry.              I reviewed the patient's new clinical results.  I reviewed the patient's new imaging results/reports including actual images and agree with reports.      Chest X-Ray:  No additional    INFUSIONS         Results from last 7 days  Lab Units 03/28/17  0552 03/27/17  0649 03/26/17  0211   WBC 10*3/mm3 8.34 8.42 10.20   HEMOGLOBIN g/dL 15.3 14.8 15.9   HEMATOCRIT % 44.0 43.3 45.8   PLATELETS 10*3/mm3 129* 117* 120*       Results from last 7 days  Lab Units 04/01/17  0416 03/31/17  0340 03/30/17  0355  03/28/17  1158 03/28/17  0552   SODIUM mmol/L 139 138 138  < > 142 139   POTASSIUM mmol/L 4.1 4.0 3.8  < > 3.6 3.3*   CHLORIDE mmol/L 103 102 104  < > 100 100   TOTAL CO2 mmol/L 31.0 25.0 27.0  < > 28.0 23.0   BUN mg/dL 54* 46* 43*  < > 39* 37*   GLUCOSE mg/dL 259* 335* 267*  < > 146* 133*   CREATININE mg/dL 1.00 1.10 1.10  < > 1.10 1.10   MAGNESIUM mg/dL  --   --  2.0  --  2.1 2.4   CALCIUM mg/dL 9.7 9.7 9.4  < > 9.0 9.2   < > = values in this interval not displayed.        Results from last 7 days  Lab Units 03/27/17  1207   PH, ARTERIAL pH units 7.492*   PCO2, ARTERIAL mm Hg 36.0   PO2 ART mm Hg 105.0   FIO2 % 35         Mech Ventilation:      I reviewed the patient's medications.    Assessment/Plan   ASSESSMENT      Hospital Problem List     * (Principal)Acute  respiratory failure    CHF exacerbation    Pulmonary edema    Severe hypoxic ischemic encephalopathy of     COPD (chronic obstructive pulmonary disease)    Medically noncompliant    Congestive heart failure (CHF)    NSTEMI (non-ST elevated myocardial infarction)    STARLA (acute kidney injury)    Dementia    Oropharyngeal dysphagia            DNR status.  Only interventions are medical.  Palliative care following regarding appropriateness of hospice and/or tube feedings.          PLAN     -Transfer to [facility]  -Complete antibiotics   -Minimize medications          I discussed the patient's findings and my recommendations with nursing staff     Plan of care and goals reviewed with mulitdisciplinary team at daily rounds.    Dany Kwok MD  Pulmonary and Critical Care Medicine  17 4:16 PM

## 2017-04-01 NOTE — PLAN OF CARE
Problem: Patient Care Overview (Adult)  Goal: Plan of Care Review  Outcome: Unable to achieve outcome(s) by discharge Date Met:  04/01/17 04/01/17 1450   Coping/Psychosocial Response Interventions   Plan Of Care Reviewed With patient   Patient Care Overview   Progress unable to show any progress toward functional goals   Outcome Evaluation   Outcome Summary/Follow up Plan Nsg reports pt is not making any medical improvements and requests OT to be DC. No goals met due to pt's medical complexity. DC OT, please re-consult if pt becomes appropriate for skilled therapy.          Problem: Inpatient Occupational Therapy  Goal: Bed Mobility Goal LTG- OT  Outcome: Unable to achieve outcome(s) by discharge Date Met:  04/01/17 03/29/17 1307 04/01/17 1450   Bed Mobility OT LTG   Bed Mobility OT LTG, Date Established 03/29/17 --    Bed Mobility OT LTG, Time to Achieve by discharge --    Bed Mobility OT LTG, Activity Type supine to sit/sit to supine --    Bed Mobility OT LTG, [place] Level maximum assist (25% patient effort);2 person assist required --    Bed Mobility OT LTG, Additional Goal AAD --    Bed Mobility OT LTG, Outcome --  goal no longer appropriate   Bed Mobility OT LTG, Reason Goal Not Met --  unable to make needed progress;medical status inhibits participation       Goal: Strength Goal LTG- OT  Outcome: Unable to achieve outcome(s) by discharge Date Met:  04/01/17 03/29/17 1307 04/01/17 1450   Strength OT LTG   Strength Goal OT LTG, Date Established 03/29/17 --    Strength Goal OT LTG, Time to Achieve by discharge --    Strength Goal OT LTG, Functional Goal Pt will tolerate BUE AAROM/PROM HEP x10 reps to increase strength/endurance/ROM to promote ADL performance.  --    Strength Goal OT LTG, Outcome --  goal no longer appropriate   Strength Goal OT LTG, Reason Goal Not Met --  medical status inhibits participation;unable to make needed progress       Goal: Static Sitting Balance Goal LTG- OT  Outcome:  Unable to achieve outcome(s) by discharge Date Met:  04/01/17 03/29/17 1307 04/01/17 1450   Static Sitting Balance OT LTG   Static Sitting Balance OT LTG, Date Established 03/29/17 --    Static Sitting Balance OT LTG, Time to Achieve by discharge --    Static Sitting Balance OT LTG, [place] Level moderate assist (50% patient effort) --    Static Sitting Balance OT LTG, Additional Goal AAD --    Static Sitting Balance OT LTG, Outcome --  goal no longer appropriate   Static Sitting Balance OT LTG, Reason Goal Not Met --  unable to make needed progress;medical status inhibits participation       Goal: Follow Directions Goal LTG- OT  Outcome: Unable to achieve outcome(s) by discharge Date Met:  04/01/17 03/29/17 1307 04/01/17 1450   Follow Directions OT LTG   Follow Directions OT LTG, Date Established 03/29/17 --    Follow Directions OT LTG, Time to Achieve by discharge --    Follow Directions OT LTG, Activity Type 1-Step;50% treatment session --    Follow Directions OT LTG, [place] Level with verbal cues;demonstration --    Follow Directions OT LTG, Additional Goal w/ MAX VCs --    Follow Directions OT LTG, Outcome --  goal no longer appropriate   Follow Directions OT LTG, Reason Goal Not Met --  unable to make needed progress;medical status inhibits participation

## 2017-04-02 PROBLEM — E11.9 DM (DIABETES MELLITUS) (HCC): Status: ACTIVE | Noted: 2017-01-01

## 2017-04-02 PROBLEM — J18.9 PNEUMONIA: Status: ACTIVE | Noted: 2017-01-01

## 2017-04-02 PROBLEM — J01.00 ACUTE MAXILLARY SINUSITIS: Status: ACTIVE | Noted: 2017-01-01

## 2017-04-02 PROBLEM — E03.8 SUBCLINICAL HYPOTHYROIDISM: Status: ACTIVE | Noted: 2017-01-01

## 2017-04-02 NOTE — PROGRESS NOTES
[facility] Medicine Services  INPATIENT PROGRESS NOTE    Date of Admission: 3/24/2017  Length of Stay: 9  Primary Care Physician: Anthony Bryan MD    Subjective-- HPI/Events overnight/ROS/CC- Hospital follow visit.  Detailed ROS not detailed as performed below      Pt is sleeping, difficult to arouse, but respond to painful stimuli. No family present. Nonverbal to me. NGT intact with TF going.    Objective      Temp:  [97.9 °F (36.6 °C)-98.8 °F (37.1 °C)] 98.4 °F (36.9 °C)  Heart Rate:  [68-86] 80  Resp:  [16-28] 28  BP: (100-124)/(55-66) 100/55    Physical Exam:  Physical Exam    General Assessment: No acute cardiopulmonary distress.     HEENT: Pupils equal and reactive. NGT intact.    Neck: Supple    CVS: RRR, S1S2 normal, + murmurs    Resp: CTAB, no adventitious sound (anterior exam only)    Abd: soft, NT, ND, normal BS, no guarding or peritoneal signs    Ext: No edema, both calves are symmetric and NTTP    Neuro: Difficult to arouse, enable to do exam, respond to painful stimuli, does not follow any commands.    Skin: warm and dry      Results Review:    I have reviewed the labs, radiology results and diagnostic studies.      Results from last 7 days  Lab Units 03/28/17  0552   WBC 10*3/mm3 8.34   HEMOGLOBIN g/dL 15.3   PLATELETS 10*3/mm3 129*       Results from last 7 days  Lab Units 04/01/17  0416   SODIUM mmol/L 139   POTASSIUM mmol/L 4.1   TOTAL CO2 mmol/L 31.0   CREATININE mg/dL 1.00   GLUCOSE mg/dL 259*       Culture Data:  Radiology Data:   Non new       I have reviewed the medications.      Assessment/Plan     Assessment/Problem List  This is a 77 yo M with history of COPD, CAD/MI, Mixed CHF, medical therapy noncompliance, and dementia, who was transferred from [facility] on 3/24/2017 after being found down at home.      Problem:    Acute respiratory failure   - multifactorial as below   - s/p intubation to protect airway at OSH, now extubated and on  2L NC        Mixed CHF exacerbation   - EF 20%   - management per cardiology      Bilat pneumonia     - CXR on 3/28 showed bilat infiltrates with effusions, worse than previous exam   - Likely HAP, but certainly at risk for pseudomonas as well due to h/o intubation   - will put on Vanc and Zosyn/Probiotics for now (4/2), not sure why abx was not started earlier      Pulmonary edema   - diurese as tolerated       Hypoxic ischemic encephalopathy (HIE)/Dementia      COPD (chronic obstructive pulmonary disease)   - not sure if there was any exac on admission, but currently no wheezing or other signs of exac      NSTEMI (non-ST elevated myocardial infarction)   - medical management per cardiology      STARLA (acute kidney injury)   - etiology unclear, resolved      Oropharyngeal dysphagia   - has TF at this time, but will need to discuss long term plans with family 4/3.    - Prognosis poor, so I would not recommend PEG   - Palliative following, hopefully will meet with family again on Mon, and consider Hospice      Maxillary sinusitis   - Already on Abx as above for pneumonia      Subclinical hypothyroidism   - TSH elevate with normal T4   - likely due to critical illness   - F/U in OP with PCP      DM2   - A1c 7.2   - Levemir + low dose SSI   - Accuchecks q6h while on TF      Medically noncompliant     DVT prophylaxis: Hep Sq    Discharge Planning: TBD. CM following. I spent about 30 min on the case, most of my time was spent reviewing charts.     Shahnaz Bustamante MD   04/02/17   1:11 PM    Please note that portions of this note may have been completed with a voice recognition program. Efforts were made to edit the dictations, but occasionally words are mistranscribed.

## 2017-04-02 NOTE — PLAN OF CARE
Problem: Patient Care Overview (Adult)  Goal: Adult Individualization and Mutuality  Outcome: Ongoing (interventions implemented as appropriate)  Goal: Discharge Needs Assessment  Outcome: Ongoing (interventions implemented as appropriate)    Problem: Cardiac: Heart Failure (Adult)  Goal: Signs and Symptoms of Listed Potential Problems Will be Absent or Manageable (Cardiac: Heart Failure)  Outcome: Ongoing (interventions implemented as appropriate)    Problem: Pressure Ulcer (Adult)  Goal: Signs and Symptoms of Listed Potential Problems Will be Absent or Manageable (Pressure Ulcer)  Outcome: Ongoing (interventions implemented as appropriate)

## 2017-04-02 NOTE — PROGRESS NOTES
Pharmacokinetic Consult - Vancomycin Dosing    Raymond Blanco is a 76 y.o. male who has been consulted for vancomycin dosing for pneumonia.     Relevant clinical data and objective history reviewed:  Lab Results   Component Value Date/Time    CREATININE 1.00 04/01/2017 04:16 AM    CREATININE 1.10 03/31/2017 03:40 AM    CREATININE 1.10 03/30/2017 03:55 AM    BUN 54 (H) 04/01/2017 04:16 AM    BUN 46 (H) 03/31/2017 03:40 AM    BUN 43 (H) 03/30/2017 03:55 AM     Estimated Creatinine Clearance: 54.9 mL/min (by C-G formula based on Cr of 1).  I/O last 3 completed shifts:  In: 2563.2 [I.V.:146.2; Other:736; NG/GT:1481; IV Piggyback:200]  Out: 2010 [Urine:2010]    Lab Results   Component Value Date/Time    WBC 8.34 03/28/2017 05:52 AM    HGB 15.3 03/28/2017 05:52 AM    HCT 44.0 03/28/2017 05:52 AM    MCV 95.9 03/28/2017 05:52 AM     (L) 03/28/2017 05:52 AM     Temp Readings from Last 3 Encounters:   04/02/17 98.4 °F (36.9 °C) (Oral)     Weight: 146 lb 14.4 oz (66.6 kg)  Baseline culture/source/susceptibility: .    Assessment/Plan  The patient will be started on vancomycin utilizing scheduled dosing based on actual body weight.  Baseline risks associated with therapy include: pre-existing renal impairment and advanced age.  Will initiate dose at 1250mg mg IV once followed by 1000mg q24h.  Pharmacy will also follow closely for s/sx of nephrotoxicity.  Serum creatinine will be ordered per policy.  Plan for trough as patient approaches steady state, prior to the 3rd dose.  Due to infection severity, will target a trough of 15-20 ug/mL.  Pharmacy will continue to follow the patient’s culture results and clinical progress daily.    Liliam Hurtado RPH

## 2017-04-02 NOTE — PLAN OF CARE
Problem: Patient Care Overview (Adult)  Goal: Plan of Care Review  Outcome: Ongoing (interventions implemented as appropriate)    04/02/17 4527   Coping/Psychosocial Response Interventions   Plan Of Care Reviewed With patient   Patient Care Overview   Progress no change   Outcome Evaluation   Outcome Summary/Follow up Plan FSBG continually in 200's. Correction insuline given. Oral care q4h. Turn q2h. Tube feed at 70ml/hr through NGT. Congested cough. Respiratory suctioning throat. Low grade temp, tylenol given. Back dressing changed in AM. Condom catheter leaking. Switched to new condom.       Goal: Adult Individualization and Mutuality  Outcome: Ongoing (interventions implemented as appropriate)  Goal: Discharge Needs Assessment  Outcome: Ongoing (interventions implemented as appropriate)    Problem: Cardiac: Heart Failure (Adult)  Goal: Signs and Symptoms of Listed Potential Problems Will be Absent or Manageable (Cardiac: Heart Failure)  Outcome: Ongoing (interventions implemented as appropriate)

## 2017-04-03 NOTE — PLAN OF CARE
Problem: Patient Care Overview (Adult)  Goal: Plan of Care Review  Outcome: Ongoing (interventions implemented as appropriate)    04/03/17 1205   Coping/Psychosocial Response Interventions   Plan Of Care Reviewed With (Dr. Najera spoke with son by phone)   Patient Care Overview   Progress declining   Outcome Evaluation   Outcome Summary/Follow up Plan Audible congestion, possibly aspirating TF. Dr. Najera spoke with son by phone regarding increased congestion, GOC; awaiting call back after son speaks with his siblings.

## 2017-04-03 NOTE — PROGRESS NOTES
Continued Stay Note  [facility]     Patient Name: Raymond Blanco  MRN: [id number]  Today's Date: 4/3/2017    Admit Date: 3/24/2017          Discharge Plan       04/03/17 1133    Case Management/Social Work Plan    Plan Palliative services    Patient/Family In Agreement With Plan yes    Additional Comments Patient resting quietly in bed, no family in room.               Discharge Codes     None        Expected Discharge Date and Time     Expected Discharge Date Expected Discharge Time    Apr 6, 2017             Soraya Johnson RN

## 2017-04-03 NOTE — PLAN OF CARE
Problem: Patient Care Overview (Adult)  Goal: Plan of Care Review  Outcome: Ongoing (interventions implemented as appropriate)    04/03/17 0342   Coping/Psychosocial Response Interventions   Plan Of Care Reviewed With patient   Patient Care Overview   Progress no change   Outcome Evaluation   Outcome Summary/Follow up Plan yaunker suction prn, tube feed in progress, low residual, non responsive. condom cath leaking, removed, applied briefs.       Goal: Adult Individualization and Mutuality  Outcome: Ongoing (interventions implemented as appropriate)  Goal: Discharge Needs Assessment  Outcome: Ongoing (interventions implemented as appropriate)    Problem: Cardiac: Heart Failure (Adult)  Goal: Signs and Symptoms of Listed Potential Problems Will be Absent or Manageable (Cardiac: Heart Failure)  Outcome: Ongoing (interventions implemented as appropriate)    Problem: Pressure Ulcer (Adult)  Goal: Signs and Symptoms of Listed Potential Problems Will be Absent or Manageable (Pressure Ulcer)  Outcome: Ongoing (interventions implemented as appropriate)

## 2017-04-03 NOTE — SIGNIFICANT NOTE
Palliative Team Meeting Attendance  13:00  ROSA Elam RN, [facility]              DO ALE Askew M.Div., [facility], [facility]              DO ARABELLA Askew, APRN

## 2017-04-03 NOTE — PROGRESS NOTES
[facility] Medicine Services  INPATIENT PROGRESS NOTE    Date of Admission: 3/24/2017  Length of Stay: 10  Primary Care Physician: Anthony Bryan MD    Subjective-- HPI/Events overnight/ROS/CC- Hospital follow visit.  Detailed ROS not detailed as performed below      No significant acute events O/N. Min responsive to sternal rub. Nonverbal. No family present.    Objective      Temp:  [98 °F (36.7 °C)-99.4 °F (37.4 °C)] 99 °F (37.2 °C)  Heart Rate:  [67-80] 76  Resp:  [18-20] 20  BP: ()/(47-58) 108/52    Physical Exam:  Physical Exam    General Assessment: No acute cardiopulmonary distress.    CVS: RRR, S1S2 normal    Resp: Coarse BS, resp non-labored    Abd: soft, NT, ND, normal BS, no guarding or peritoneal signs    Ext: No edema, both calves are symmetric and NTTP    Neuro: min responsive to sternal rub    Skin: W/D/I. No rash.      Results Review:    I have reviewed the labs, radiology results and diagnostic studies.      Results from last 7 days  Lab Units 04/03/17  0525   WBC 10*3/mm3 8.62   HEMOGLOBIN g/dL 14.3   PLATELETS 10*3/mm3 126*       Results from last 7 days  Lab Units 04/03/17  0525   SODIUM mmol/L 138   POTASSIUM mmol/L 4.6   TOTAL CO2 mmol/L 28.0   CREATININE mg/dL 1.00   GLUCOSE mg/dL 302*       Culture Data:  Radiology Data:     I have reviewed the medications.        Assessment/Plan     Assessment/Problem List    This is a 75 yo M with history of COPD, CAD/MI, Mixed CHF, medical therapy noncompliance, and dementia, who was transferred from [facility] on 3/24/2017 after being found down at home.        Problem:  Acute respiratory failure  - multifactorial as below  - s/p intubation to protect airway at OSH, now extubated       Mixed CHF exacerbation  - EF 20%  - management per cardiology     Bilat pneumonia  - CXR on 3/28 showed bilat infiltrates with effusions, worse than previous exam  - Likely HAP, but certainly at risk for pseudomonas  as well due to h/o intubation  - will put on Vanc and Zosyn/Probiotics for now (4/2), not sure why abx was not started earlier     Pulmonary edema  - diurese as tolerated     Hypoxic ischemic encephalopathy (HIE)/Dementia     COPD (chronic obstructive pulmonary disease)  - not sure if there was any exac on admission, but currently no wheezing or other signs of exac     NSTEMI (non-ST elevated myocardial infarction)  - medical management per cardiology     STARLA (acute kidney injury)  - etiology unclear, resolved     Oropharyngeal dysphagia  - has TF at this time, but will need to discuss long term plans with family 4/3.   - Prognosis poor, so I would not recommend PEG  - Palliative following, hopefully will meet with family again on Mon, and consider Hospice     Maxillary sinusitis  - Already on Abx as above for pneumonia     Subclinical hypothyroidism  - TSH elevate with normal T4  - likely due to critical illness  - F/U in OP with PCP     DM2  - A1c 7.2  - Levemir + low dose SSI  - Accuchecks q6h while on TF     Medically noncompliant    Plan:  - nothing to add from IM. Pt is minimally responsive to stimuli. Appreciate Dr. Najera's help with end of life care/talk with family. Will defer to him to talk with family, who are still discussing amongst themselves according to documentation. Overall prognosis is poor and I would not recommend PEG at this time, which I think will cause more harm/risk of complications than real/meaningful benefit.    DVT prophylaxis: Hep Sq     Discharge Planning: MARLYN Bustamante MD   04/03/17   1:38 PM    Please note that portions of this note may have been completed with a voice recognition program. Efforts were made to edit the dictations, but occasionally words are mistranscribed.

## 2017-04-03 NOTE — PROGRESS NOTES
"Palliative Care Progress Note    Date of Admission: 3/24/2017    Subjective:  Patient continues to be minimally responsive and nonverbal.  No current facility-administered medications on file prior to encounter.      No current outpatient prescriptions on file prior to encounter.       Pharmacy to dose vancomycin      acetaminophen **OR** acetaminophen  •  dextrose  •  dextrose  •  dextrose  •  glucagon (human recombinant)  •  glucagon (human recombinant)  •  ipratropium-albuterol  •  ondansetron **OR** ondansetron  •  piperacillin-tazobactam **AND** [COMPLETED] vancomycin **AND** Pharmacy to dose vancomycin  •  potassium chloride **OR** potassium chloride **OR** potassium chloride  •  sodium chloride  •  sodium chloride    Objective: /52  Pulse 76  Temp 99 °F (37.2 °C) (Oral)   Resp 20  Ht 66\" (167.6 cm)  Wt 146 lb 14.4 oz (66.6 kg)  SpO2 96%  BMI 23.71 kg/m2     Intake/Output Summary (Last 24 hours) at 04/03/17 1248  Last data filed at 04/02/17 1700   Gross per 24 hour   Intake             1228 ml   Output                0 ml   Net             1228 ml     Physical Exam:      General Appearance:    only occasionally opens eyes to stimuli, nonverbal, noncommunicative    Head:    Normocephalic, without obvious abnormality, atraumatic   Eyes:            Lids and lashes normal, conjunctivae and sclerae normal, no   icterus, no pallor, corneas clear, PERRLA   Ears:    Ears appear intact with no abnormalities noted   Throat:   No oral lesions, no thrush, oral mucosa moist   Neck:   No adenopathy, supple, trachea midline, no thyromegaly, no   carotid bruit, no JVD   Back:     No kyphosis present, no scoliosis present, no skin lesions,      erythema or scars, no tenderness to percussion or                   palpation,   range of motion normal   Lungs:     Clear to auscultation,respirations regular, even and                  unlabored    Heart:    Regular rhythm and normal rate, normal S1 and S2, no            " murmur, no gallop, no rub, no click   Chest Wall:    No abnormalities observed   Abdomen:     Normal bowel sounds, no masses, no organomegaly, soft        non-tender, non-distended, no guarding, no rebound                tenderness   Rectal:     Deferred   Extremities:   no edema noted    Pulses:   Pulses palpable and equal bilaterally   Skin:   No bleeding, bruising or rash   Lymph nodes:   No palpable adenopathy           Results from last 7 days  Lab Units 04/03/17  0525   WBC 10*3/mm3 8.62   HEMOGLOBIN g/dL 14.3   HEMATOCRIT % 43.5   PLATELETS 10*3/mm3 126*       Results from last 7 days  Lab Units 04/03/17  0525  03/28/17  1158   SODIUM mmol/L 138  < > 142   POTASSIUM mmol/L 4.6  < > 3.6   CHLORIDE mmol/L 105  < > 100   TOTAL CO2 mmol/L 28.0  < > 28.0   BUN mg/dL 51*  < > 39*   CREATININE mg/dL 1.00  < > 1.10   CALCIUM mg/dL 9.3  < > 9.0   BILIRUBIN mg/dL  --   --  1.1   ALK PHOS U/L  --   --  74   ALT (SGPT) U/L  --   --  25   AST (SGOT) U/L  --   --  62*   GLUCOSE mg/dL 302*  < > 146*   < > = values in this interval not displayed.    Impression: nstemi  Respiratory failure, hypoxic  Confusion  Goals of care  Plan: Long talk with patient's son.  Discussed continuing current level of care, which would include doing a PEG tube, vs a comfort focused plan of care and looking at hospice and end of life care.  Family to talk amongst themselves.          Rolo Najera DO  04/03/17  12:48 PM

## 2017-04-04 NOTE — PROGRESS NOTES
[facility] Medicine Services  INPATIENT PROGRESS NOTE    Date of Admission: 3/24/2017  Length of Stay: 11  Primary Care Physician: Anthony Bryan MD    Subjective-- HPI/Events overnight/ROS/CC- Hospital follow visit.  Detailed ROS not detailed as performed below      Resting comfortably, a little more responsive today. No family at bedside.      Objective      Temp:  [97.4 °F (36.3 °C)-98.6 °F (37 °C)] 97.4 °F (36.3 °C)  Heart Rate:  [65-77] 72  Resp:  [18-28] 28  BP: ()/(54-62) 116/62    Physical Exam:  Physical Exam     General Assessment: No acute cardiopulmonary distress.     CVS: RRR, S1S2 normal     Resp: Coarse BS, resp non-labored     Abd: soft, NT, ND, normal BS, no guarding or peritoneal signs     Ext: No edema, both calves are symmetric and NTTP     Neuro: min responsive to sternal rub     Skin: W/D/I. No rash.      Results Review:    I have reviewed the labs, radiology results and diagnostic studies.      Results from last 7 days  Lab Units 04/03/17  0525   WBC 10*3/mm3 8.62   HEMOGLOBIN g/dL 14.3   PLATELETS 10*3/mm3 126*       Results from last 7 days  Lab Units 04/03/17  0525   SODIUM mmol/L 138   POTASSIUM mmol/L 4.6   TOTAL CO2 mmol/L 28.0   CREATININE mg/dL 1.00   GLUCOSE mg/dL 302*       Culture Data:  Radiology Data:     I have reviewed the medications.        Assessment/Plan     Assessment/Problem List  This is a 75 yo M with history of COPD, CAD/MI, Mixed CHF, medical therapy noncompliance, and dementia, who was transferred from [facility] on 3/24/2017 after being found down at home.          Problem:  Acute respiratory failure  - multifactorial as below  - s/p intubation to protect airway at OSH, now extubated, on oxygen via NC      Mixed CHF exacerbation  - EF 20%  - cont meds (cardiology s/o 3/30)    Bilat pneumonia  - CXR on 3/28 showed bilat infiltrates with effusions, worse than previous exam  - Likely HAP, but certainly at risk  for pseudomonas as well due to h/o intubation  - will put on Vanc and Zosyn/Probiotics for now (4/2), not sure why abx was not started earlier      Pulmonary edema  - diurese as tolerated      Hypoxic ischemic encephalopathy (HIE)/Dementia      COPD (chronic obstructive pulmonary disease)  - not sure if there was any exac on admission, but currently no wheezing or other signs of exac      NSTEMI (non-ST elevated myocardial infarction)  - medical management per cardiology      STARLA (acute kidney injury)  - etiology unclear, resolved      Oropharyngeal dysphagia  - has TF at this time, but I agree that pt is probably aspirating it. Need to discuss long term plans with family, coordinated by Palliative med.   - Prognosis poor, so I would not recommend PEG    Maxillary sinusitis  - Already on Abx as above for pneumonia      Subclinical hypothyroidism  - TSH elevate with normal T4  - likely due to critical illness  - F/U in OP with PCP      DM2  - A1c 7.2  - Levemir + low dose SSI  - Accuchecks q6h while on TF      Medically noncompliant     Plan:  - No significant changes. Overall stable, but poor prognosis for any meaningful recovery. Dr. Najera's help appreciated. I also tried to call pt's son (Chava), no answer. I left a brief message, need to have a meeting with him and/or family to discuss goal/plans. Currently min responsive and on TF for nutrition, but not tolerating it. I agree with nursing staff that he is probably aspirating TF. Given overall poor prognosis, I would not recommend PEG. Will try to get a hold of son again tomorrow. Cont with current therapy.    DVT prophylaxis: Hep Sq      Shahnaz Bustamante MD   04/04/17   12:57 PM    Please note that portions of this note may have been completed with a voice recognition program. Efforts were made to edit the dictations, but occasionally words are mistranscribed.

## 2017-04-04 NOTE — PLAN OF CARE
Problem: Patient Care Overview (Adult)  Goal: Plan of Care Review  Outcome: Ongoing (interventions implemented as appropriate)    04/04/17 1110   Patient Care Overview   Progress declining   Outcome Evaluation   Outcome Summary/Follow up Plan Family has requested to transfer to a In-patient Hospice Care Unit that is close to [facility]. Kayley De La Cruz, Hospice Nurse/  notified CCC to do a face to face for admission

## 2017-04-04 NOTE — PROGRESS NOTES
"Palliative Care Progress Note    Date of Admission: 3/24/2017    Subjective:  Patient continues to be minimally responsive and nonverbal.  No current facility-administered medications on file prior to encounter.      No current outpatient prescriptions on file prior to encounter.       Pharmacy to dose vancomycin      acetaminophen **OR** acetaminophen  •  dextrose  •  dextrose  •  dextrose  •  glucagon (human recombinant)  •  glucagon (human recombinant)  •  ipratropium-albuterol  •  ondansetron **OR** ondansetron  •  piperacillin-tazobactam **AND** [COMPLETED] vancomycin **AND** Pharmacy to dose vancomycin  •  potassium chloride **OR** potassium chloride **OR** potassium chloride  •  sodium chloride  •  sodium chloride    Objective: /62  Pulse 77  Temp 97.4 °F (36.3 °C) (Oral)   Resp 18  Ht 66\" (167.6 cm)  Wt 146 lb 14.4 oz (66.6 kg)  SpO2 92%  BMI 23.71 kg/m2     Intake/Output Summary (Last 24 hours) at 04/04/17 1105  Last data filed at 04/04/17 0918   Gross per 24 hour   Intake              300 ml   Output                0 ml   Net              300 ml     Physical Exam:      General Appearance:    only occasionally opens eyes to stimuli, nonverbal, noncommunicative    Head:    Normocephalic, without obvious abnormality, atraumatic   Eyes:            Lids and lashes normal, conjunctivae and sclerae normal, no   icterus, no pallor, corneas clear, PERRLA   Ears:    Ears appear intact with no abnormalities noted   Throat:   No oral lesions, no thrush, oral mucosa moist   Neck:   No adenopathy, supple, trachea midline, no thyromegaly, no   carotid bruit, no JVD   Back:     No kyphosis present, no scoliosis present, no skin lesions,      erythema or scars, no tenderness to percussion or                   palpation,   range of motion normal   Lungs:     Clear to auscultation,respirations regular, even and                  unlabored    Heart:    Regular rhythm and normal rate, normal S1 and S2, no           "  murmur, no gallop, no rub, no click   Chest Wall:    No abnormalities observed   Abdomen:     Normal bowel sounds, no masses, no organomegaly, soft        non-tender, non-distended, no guarding, no rebound                tenderness   Rectal:     Deferred   Extremities:   no edema noted    Pulses:   Pulses palpable and equal bilaterally   Skin:   No bleeding, bruising or rash   Lymph nodes:   No palpable adenopathy           Results from last 7 days  Lab Units 04/03/17  0525   WBC 10*3/mm3 8.62   HEMOGLOBIN g/dL 14.3   HEMATOCRIT % 43.5   PLATELETS 10*3/mm3 126*       Results from last 7 days  Lab Units 04/03/17  0525  03/28/17  1158   SODIUM mmol/L 138  < > 142   POTASSIUM mmol/L 4.6  < > 3.6   CHLORIDE mmol/L 105  < > 100   TOTAL CO2 mmol/L 28.0  < > 28.0   BUN mg/dL 51*  < > 39*   CREATININE mg/dL 1.00  < > 1.10   CALCIUM mg/dL 9.3  < > 9.0   BILIRUBIN mg/dL  --   --  1.1   ALK PHOS U/L  --   --  74   ALT (SGPT) U/L  --   --  25   AST (SGOT) U/L  --   --  62*   GLUCOSE mg/dL 302*  < > 146*   < > = values in this interval not displayed.    Impression: nstemi  Respiratory failure, hypoxic  Confusion  Goals of care  Plan: Tried calling the patient's son but no answer.  We'll continue to try to reach the son by phone to discuss overall goals of care.          Rolo Najera DO  04/04/17  11:05 AM    Talked to patient's son.  After a long conversation he does agree with not pursuing a feeding tube and is interested in inpt hospice closer to home.  Will consult hospice.    Rolo Najera DO  1:15 PM  4/4/17

## 2017-04-04 NOTE — PROGRESS NOTES
"Continued Stay Note  [facility]     Patient Name: Raymond Blanco  MRN: [id number]  Today's Date: 4/4/2017    Admit Date: 3/24/2017          Discharge Plan       04/04/17 1740    Case Management/Social Work Plan    Plan ? Inpatient Hospice services    Additional Comments Hospice consult received.  Chart reviewed.  CHF.  Cardiomyopathy.  Co-morbidities.  Per Dr. Najera, son seeking end of life \"closer to home\".  Contacted Niru Pereira, [facility] who visited with pt and contacted sonChava, by phone.  Son undecided re: course he wants to pursue - plan is for Ms. Pereira to contact son 4/5/17 for further discussion re: options.  If can be of further assist please contact....ext [phone].              Discharge Codes     None        Expected Discharge Date and Time     Expected Discharge Date Expected Discharge Time    Apr 7, 2017             Kayley De La Cruz RN    "

## 2017-04-04 NOTE — PROGRESS NOTES
Adult Nutrition  Assessment/PES    Patient Name:  Raymond Blanco  YOB: 1940  MRN: [id number]  Admit Date:  3/24/2017    Assessment Date:  4/4/2017        Reason for Assessment       04/04/17 1006    Reason for Assessment    Reason For Assessment/Visit TF/PN;follow up protocol    Time Spent (min) 30    Diagnosis --   per notes this admission                  Labs/Tests/Procedures/Meds       04/04/17 1009    Labs/Tests/Procedures/Meds    Labs/Tests Review Reviewed                Nutrition Prescription Ordered                     04/04/17 1009    Nutrition Prescription PO    Current PO Diet NPO            Evaluation of Received Nutrient/Fluid Intake       04/04/17 1009    Evaluation of Received Nutrient/Fluid Intake    Number of Days Evaluated 3 days    Nutrition Delivered Calorie Evaluation;Protein Evaluation    Calorie Intake Evaluation    Enteral Calories (kcal) 1070    Total Calories (kcal) 1070    % Kcal Needs 77    Protein Intake Evaluation    Enteral Protein (gm) 72    Total Protein (gm) 72    % Protein Needs 83    Free Water Intake Evaluation    Free Water (mL) 769 mL    Free Water Flushes (mL) 447 mL    Total Free Water Intake (mL) 1216    EN Evaluation    TF Changes Discontinued   Advised by NSG tube feeding was discontinued yesterday. Advised RD will d/c order as still in EPIC .              Problem/Interventions:        Problem 1       04/04/17 1012    Nutrition Diagnoses Problem 1    Resolved? Yes            Problem 2       04/04/17 1012    Nutrition Diagnoses Problem 2    Resolved? Yes            Problem 3       04/04/17 1012    Nutrition Diagnoses Problem 3    Problem 3 Nutrition Appropriate for Condition at this Time    Etiology (related to) Goals of Care    Signs/Symptoms (evidenced by) --   Prognosis poor-do not recommend PEG                Intervention Goal       04/04/17 1013    Intervention Goal    General Palliative Care            Nutrition Intervention       04/04/17 1013     Nutrition Intervention    RD/Tech Action Follow Tx progress              Education/Evaluation       04/04/17 1013    Monitor/Evaluation    Monitor Per protocol            Electronically signed by:  Diann Gu RD  04/04/17 10:21 AM

## 2017-04-04 NOTE — PROGRESS NOTES
Pharmacy Consult - Vancomycin Dosing    Pharmacy consulted to dose and manage vancomycin therapy for this 77yo male with bilateral pneumonia.    Current Antimicrobial Therapy  1. Zosyn 4.5g IV q6h (4/2)  2. Vancomycin 1000mg IV q24h (4/2)    Allergies:  Review of patient's allergies indicates no known allergies.    Labs  Results from last 7 days   Lab Units 04/03/17  0525 04/01/17  0416 03/31/17  0340   SODIUM mmol/L 138 139 138   POTASSIUM mmol/L 4.6 4.1 4.0   CHLORIDE mmol/L 105 103 102   TOTAL CO2 mmol/L 28.0 31.0 25.0   BUN mg/dL 51* 54* 46*   CREATININE mg/dL 1.00 1.00 1.10   CALCIUM mg/dL 9.3 9.7 9.7   GLUCOSE mg/dL 302* 259* 335*     Results from last 7 days   Lab Units 04/03/17  0525   WBC 10*3/mm3 8.62   HEMOGLOBIN g/dL 14.3   HEMATOCRIT % 43.5   PLATELETS 10*3/mm3 126*     Max Temperature in Last 24 Hours: 99    Evaluation of Dosing  Weight: 66.6 kg  Goal Trough: 15-20 mcg/mL    Estimated Creatinine Clearance: 54.9 mL/min (by C-G formula based on Cr of 1).    I/O last 3 completed shifts:  In: 200 [IV Piggyback:200]  Out: -  *unmeasured*    Microbiology    Blood Culture   Date Value Ref Range Status   04/02/2017 No growth at 24 hours  Preliminary   04/02/2017 No growth at 24 hours  Preliminary     Evaluation of Level    Lab Results   Component Value Date    [facility]     Vancomycin trough drawn appropriately at 1428 prior to the 3rd total dose returned slightly subtherapeutic for indication.     Assessment/Plan:  1. Due to patients age, expected accumulation with further dosing, and poor prognosis, will continue vancomycin 1000mg IV q24h.  2. No further levels ordered at this.  Will consider a repeat trough in ~5-7 days if patient remains on vancomycin.  3. Pharmacy will continue to monitor and adjust vancomycin dose as necessary based on renal function, cultures, labs, and clinical status.     Thank you,  Nathanael Patrick, PharmD  Pharmacy Resident  4/4/2017  3:22 PM

## 2017-04-04 NOTE — PLAN OF CARE
"Problem: Patient Care Overview (Adult)  Goal: Plan of Care Review  Outcome: Ongoing (interventions implemented as appropriate)    04/03/17 1205 04/04/17 0319 04/04/17 1710   Coping/Psychosocial Response Interventions   Plan Of Care Reviewed With --  --  patient   Patient Care Overview   Progress declining --  --    Outcome Evaluation   Outcome Summary/Follow up Plan --  NG in place clamped. TF discontinued. NPO. FSBS dc'd. Opens eyes but generally nonresponsive --        Goal: Adult Individualization and Mutuality  Outcome: Ongoing (interventions implemented as appropriate)    03/30/17 2021 04/04/17 1803   Individualization   Patient Specific Preferences pt son calls him \"Hot Sukhi\" --    Patient Specific Goals --  pain management       Goal: Discharge Needs Assessment  Outcome: Ongoing (interventions implemented as appropriate)    04/03/17 0342   Discharge Needs Assessment   Concerns To Be Addressed adjustment to diagnosis/illness concerns;basic needs concerns;discharge planning concerns   Readmission Within The Last 30 Days no previous admission in last 30 days   Equipment Needed After Discharge none   Discharge Facility/Level Of Care Needs nursing facility, skilled   Current Discharge Risk chronically ill   Discharge Disposition still a patient   Current Health   Anticipated Changes Related to Illness inability to care for self   Self-Care   Equipment Currently Used at Home none   Living Environment   Transportation Available car;ambulance         Problem: Cardiac: Heart Failure (Adult)  Intervention: Promote Functional Ability    03/29/17 0400 04/03/17 2000 04/04/17 1710   Coping/Psychosocial Interventions   Environmental Support --  calm environment promoted --    Activity   Activity Type --  --  activity adjusted per tolerance   Activity Level of Assistance --  --  assistance, 3 or more people   Musculoskeletal Interventions   Self-Care Promotion independence encouraged --  --        Intervention: Provide " Oxygenation/Ventilation/Perfusion Support    04/03/17 1728 04/03/17 2000 04/04/17 1710   Positioning   Head Of Bed (HOB) Position --  --  HOB at 45 degrees   Safety Interventions   Medication Review/Management --  medications reviewed --    Activity   Activity Type --  --  activity adjusted per tolerance   Respiratory Interventions   Airway/Ventilation Management pulmonary hygiene promoted --  --        Intervention: Support Psychosocial Response to Heart Failure    03/29/17 1224 04/03/17 1728 04/03/17 2000   Coping Strategies   Family/Support System Care --  caregiver stress acknowledged --    Supportive Measures (did not attempt to rouse pt, pt calmly resting) --  --    Coping/Psychosocial Interventions   Life Transition/Adjustment --  --  advanced care planning facilitated       Intervention: Monitor/Manage Nutrition Support    04/03/17 0110   Adjust Diet to Patient Tolerance   Nutrition Interventions (changed tube feeding bag peptamen af and new tubing )       Intervention: Gradually Correct Positive Fluid Balance    04/03/17 2000 04/04/17 1710   Skin Interventions   Skin Protection adhesive use limited --    Positioning   Positioning --  semi-Fowlers       Intervention: Prevent/Manage DVT/VTE Risk    04/03/17 1728   Support Surgical/Anesthesia Recovery   Venous Thromboembolism Prevent/Manage sequential compression devices off;anticoagulant therapy maintained       Intervention: Monitor/Manage Cardiac Rhythm Disturbance    04/03/17 1728   Safety Interventions   Emergency Measures airway opened         Goal: Signs and Symptoms of Listed Potential Problems Will be Absent or Manageable (Cardiac: Heart Failure)  Outcome: Ongoing (interventions implemented as appropriate)    04/03/17 0342   Cardiac: Heart Failure   Problems Assessed (Heart Failure) all   Problems Present (Heart Failure) dysrhythmia/arrhythmia;respiratory compromise         Problem: Pressure Ulcer (Adult)  Intervention: Promote/Optimize Nutrition     04/03/17 1728   Hygiene Care Assistance   Oral Care oral care provided       Intervention: Prevent/Manage Excess Moisture    04/03/17 2000 04/03/17 2300   Skin Interventions   Skin Protection adhesive use limited --    Hygiene Care Assistance   Perineal Care --  cleansed;absorbent pad changed;perianal area cleansed   Hygiene Care   Bathing/Skin Care --  bath, partial;dressed/undressed;incontinence care;linen changed       Intervention: Prevent/Minimize Sheer/Friction Injuries    04/03/17 2000 04/04/17 0557   Skin Interventions   Pressure Reduction Techniques frequent weight shift encouraged --    Pressure Reduction Devices positioning supports utilized --    Positioning   Positioning/Transfer Devices --  pillows;in use

## 2017-04-04 NOTE — DISCHARGE PLACEMENT REQUEST
"Rut Thomas (76 y.o. Male)     Date of Birth Social Security Number Address Home Phone MRN    1940  [id number] [phone] [id number]    Restoration Marital Status          Other        Admission Date Admission Type Admitting Provider Attending Provider Department, Room/Bed    3/24/17 Urgent Shahnaz Bustamante MD Khamvanthong, Phonekeo, MD [address] GYN, N537/1    Discharge Date Discharge Disposition Discharge Destination                      Attending Provider: Shahnaz Bustamante MD     Allergies:  No Known Allergies    Isolation:  None   Infection:  None   Code Status:  Conditional    Ht:  66\" (167.6 cm)   Wt:  146 lb 14.4 oz (66.6 kg)    Admission Cmt:  None   Principal Problem:  Acute respiratory failure [J96.00]                 Active Insurance as of 3/24/2017     Primary Coverage     Payor Plan Insurance Group Employer/Plan Group    MEDICARE MEDICARE A & B      Payor Plan Address Payor Plan Phone Number Effective From Effective To    [address] [phone] 6/1/2003     [place] [address]       Subscriber Name Subscriber Birth Date Member ID       RUT THOMAS 1940 [id number]                 Emergency Contacts      (Rel.) Home Phone Work Phone Mobile Phone    Chava Thomas (Son) [phone] -- [phone]    Shamir Thomas (Son) -- -- [phone]            Emergency Contact Information     Name Relation Home Work Mobile    Chava Thomas Son [phone]    Shamir Thomas Son   [phone]          Insurance Information                MEDICARE/MEDICARE A & B Phone: [phone]    Subscriber: ThomasRut Subscriber#: [id number]    Group#:  Precert#:              History & Physical      Dany Kwok MD at 3/24/2017 10:55 PM               Chief complaint: CHF exacerbation    Subjective     Patient is a 76 y.o. male presents with to [facility] today around 1700.  Patient " "was found down on his porch.  The last time his family had spoken to him was 0900.  He was taken to [facility] and was found to be hypoxic w/ pulmonary edema, Troponin 0.61, Lactic acid 3.4, Cr 1.4, glucose 254.  The patient was intubated for airway protection and transported by air.  He received a total of 80 mg of lasix and was placed on a NTG gtt prior to transport.  He is currently intubated and sedated and noncommunicative so all history is obtained from the scant records sent with him.  No family is yet available.      History  Past Medical History:   Diagnosis Date   • CHF (congestive heart failure)    • Coronary artery disease    • Emphysema of lung      Past Surgical History:   Procedure Laterality Date   • CORONARY ARTERY BYPASS GRAFT       History reviewed. No pertinent family history.  Social History   Substance Use Topics   • Smoking status: Current Every Day Smoker   • Smokeless tobacco: None   • Alcohol use None     No prescriptions prior to admission.     Allergies:  Review of patient's allergies indicates no known allergies.    Review of Systems   unable to obtain ROS from patient (sedated) and no family available      Objective     Vital Signs  Temp:  [98.2 °F (36.8 °C)] 98.2 °F (36.8 °C)  Heart Rate:  [] 98  Resp:  [24-28] 24  BP: (141-149)/(80-95) 147/92  FiO2 (%):  [100 %] 100 %    Physical Exam:    Objective:  General Appearance:  In no acute distress and ill-appearing.    Vital signs: (most recent): Blood pressure 147/92, pulse 98, temperature 98.2 °F (36.8 °C), temperature source Axillary, resp. rate 24, height 66\" (167.6 cm), weight 153 lb 9.6 oz (69.7 kg), SpO2 92 %.    HEENT: (Oral intubation)    Lungs:  Normal respiratory rate and normal effort.  He is not in respiratory distress.  There are wheezes, rales and rhonchi.    Heart: Normal rate.  Regular rhythm.  S1 normal and S2 normal.  No murmur, gallop or friction rub.   Chest: Symmetric chest wall expansion.   Abdomen: Abdomen is soft and " non-distended.  Bowel sounds are normal.   There is no abdominal tenderness.   There is no mass. There is no splenomegaly. There is no hepatomegaly.   Extremities: There is dependent edema.  There is no deformity.    Neurological: (Sedated on mechanical ventilation).    Pupils:  Pupils are equal, round, and reactive to light.    Skin:  Warm and dry.  (Multiple cysts on chest and back)          Results Review:    I reviewed the patient's new clinical results.  I reviewed the patient's new imaging results and agree with the interpretation.  I reviewed the patient's other test results and agree with the interpretation  I personally viewed and interpreted the patient's EKG/Telemetry data    Assessment/Plan     Principal Problem:    Acute respiratory failure  Active Problems:    CHF exacerbation    Pulmonary edema    COPD (chronic obstructive pulmonary disease)    Medically noncompliant    Congestive heart failure (CHF)      Plan:    -Admit to ICU  -Continue mechanical ventilator support  -Increase PEEP and wean O2  -Pre-and afterload reduction  -Loop diuretics  -DVT and GI prophylaxis  -Cardiac enzymes  -Baseline labs  -Critically ill with guarded prognosis and will discuss this with the family when they arrive.    I discussed the patients findings and my recommendations with nursing staff.     I have seen and examined patient, performing a face-to-face diagnostic evaluation with plan of care reviewed and developed with APRN and nursing staff. I have addended and modified the above history of present illness, physical examination, and assessment and plan to reflect my findings and impressions.    Critical Care time spent in direct patient care: 45 minutes (excluding procedure time, if applicable) including high complexity decision making to assess, manipulate, and support vital organ system failure in this individual who has impairment of one or more vital organ systems such that there is a high probability of imminent  or life threatening deterioration in the patient’s condition.      Dany Kwok MD  Pulmonary and Critical Care Medicine  03/25/17  12:33 AM             Electronically signed by Dany Kwok MD at 3/25/2017 12:37 AM

## 2017-04-04 NOTE — PROGRESS NOTES
Continued Stay Note  [facility]     Patient Name: Raymond Blanco  MRN: [id number]  Today's Date: 4/4/2017    Admit Date: 3/24/2017          Discharge Plan       04/04/17 1332    Case Management/Social Work Plan    Plan Undecided    Additional Comments Hospice consult received. Chart reviewed. Per Dr. Najera, family considering inpatient hospice closer to [facility] and at [facility].  called son, Chava, to educate on inpatient hospice at [facility] and inpatient hospice closer to home. Son is unsure where he would like for patient to receive inpatient hospice care. Son to call his family to make decision and will return call to . Please call if you have questions at [phone].              Discharge Codes     None        Expected Discharge Date and Time     Expected Discharge Date Expected Discharge Time    Apr 7, 2017             DIANA Jarrett

## 2017-04-05 NOTE — PLAN OF CARE
Problem: Patient Care Overview (Adult)  Goal: Plan of Care Review  Outcome: Ongoing (interventions implemented as appropriate)    04/05/17 0512   Patient Care Overview   Progress no change   Outcome Evaluation   Outcome Summary/Follow up Plan Pt mostly unresponsive but will occasionally open eyes. Rested well. Held Coreg d/t low BP. NG remains clamped without problem. Family would like to transfer pt to inpatient Hospice close to [facility].         Problem: Cardiac: Heart Failure (Adult)  Goal: Signs and Symptoms of Listed Potential Problems Will be Absent or Manageable (Cardiac: Heart Failure)  Outcome: Ongoing (interventions implemented as appropriate)    Problem: Pressure Ulcer (Adult)  Goal: Signs and Symptoms of Listed Potential Problems Will be Absent or Manageable (Pressure Ulcer)  Outcome: Ongoing (interventions implemented as appropriate)

## 2017-04-05 NOTE — PROGRESS NOTES
Continued Stay Note  [facility]     Patient Name: Raymond Blanco  MRN: [id number]  Today's Date: 4/5/2017    Admit Date: 3/24/2017          Discharge Plan       04/05/17 1714    Case Management/Social Work Plan    Plan ?[facility] inpatient hospice services when appropriate    Patient/Family In Agreement With Plan yes    Additional Comments Chart reviewed.  Resting quietly.  No obvious distress at present.  Primary reports upper respiratory congestion this AM with audible rales.  Robinul ordered per Dr. Najera.  Son contacted DIANA Jarrett, this PM requesting transfer to [facility].  Niru Pereira RN CHPN, [facility] Hospital Liaison notified.  She visited and assessed pt this PM.  She spoke with Dr. Zhen Blank, HCP, Medical Director, states symptom obey doesn't meet [facility] criteria at present.  [facility] Hospital Liaison will see and reassess in AM.  Updates to and from DIANA Jarrett, [facility] Inpatient Hospice team.  Will follow for hospice support and to assist/facilitate access to hospice services.  If can be of further assist please contact....ext [phone].      04/05/17 1635    Case Management/Social Work Plan    Plan Undetermined    Additional Comments P              Discharge Codes     None        Expected Discharge Date and Time     Expected Discharge Date Expected Discharge Time    Apr 7, 2017             Kayley De La Cruz RN

## 2017-04-05 NOTE — PLAN OF CARE
Problem: Patient Care Overview (Adult)  Goal: Plan of Care Review  Outcome: Ongoing (interventions implemented as appropriate)    04/05/17 1040   Coping/Psychosocial Response Interventions   Plan Of Care Reviewed With caregiver   Patient Care Overview   Progress declining   Outcome Evaluation   Outcome Summary/Follow up Plan Patient unresponsive to verbal and touch, urnie output dark and concentrated, no mottling noted, not eating or drinking, needs medication for pain, labored breathing and congestion. Dr. Najera ordered Morphine and Robinual. Hospice consulted for transition to [facility].

## 2017-04-05 NOTE — PROGRESS NOTES
Continued Stay Note  [facility]     Patient Name: Raymond Blanco  MRN: [id number]  Today's Date: 4/5/2017    Admit Date: 3/24/2017          Discharge Plan       04/05/17 1635    Case Management/Social Work Plan    Plan Undetermined    Additional Comments Chart reviewed. Son returned call to this SW. States he spoke with family and they would like for patient to go to the [facility] in [place] if appropriate. SW placed call to Niru Shelton, liaison for [facility]. [facility] deemed patient not medically appropriate for inpatient services at [facility] at this time, but will reevaluate tomorrow. SW made reservation with [facility] for 11:00am for possible transfer and will cancel if patient is to remain at [facility]. Communicated with Kayley De La Cruz, STEFF, Hospice Case Manager. If this SW can be of assistance, please call at [phone].              Discharge Codes     None        Expected Discharge Date and Time     Expected Discharge Date Expected Discharge Time    Apr 7, 2017             DIANA Jarrett

## 2017-04-05 NOTE — DISCHARGE SUMMARY
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       Presenting Problem/History of Present Illness  Congestive heart failure (CHF) [I50.9]     Chief Complaint on Day of Discharge:   Resting, appears comfortable. Cheyne-heard breathing noted during exam.     Hospital Course  77y/o M with PMH of mixed CHF, T2DM, COPD and CAD presented to OSH 3/24/17, found down, unresponsive with emesis noted, was noted to be hypoxic w/pulm edema, Troponin 0.61, Lactic acid 3.4, Cr 1.4, glucose 254. He was intubated and transferred by helicopter to [facility] for higher level of care, admitted to ICU.     ICU intensivist managed care for acute resp failure. TTE was performed EF <20%, cardiology consulted for NSTEMI and mixed CHF exacerbation- medically managed 2nd to not candidate for invasive testing, on asa, plavix., statin, and IV heparin.He  remained on Coreg (held at times 2nd to low bp), lisinopril held initially 2nd to STARLA, was resumed with improved renal function. He continues diuresis as tolerates for pulm edema.   Was successfully extubated 3/27/17. Transferred out of ICU, followed by hospitalist service. Pt with ongoing encephalopathy, anoxic brain injury.Neurology, Dr Chung evaluated patient, poor prognosis for long-term survival, showed no improvement with the antiseizure medications and anticholinesterase inhibitors. Continued TF 2nd to oropharyngeal dysphagia per SLP evaluation. Palliative care was consulted during course of stay. Have been discussing goals of care with family, pt now DNR. Cardiology recommending palliative care/hospice, has signed off.    Ongoing dysphagia, family will not pursue PEG placement. TF held 2nd to concerns for aspiration. Patient on vancomycin and zosyn since 4/2 for bilat pna, likely HAP as well treating maxillary sinusitis. FSBS/insulin have been discontinued given goals of care now comfort. Dr. Najera, palliative care has continued to be in communication with patients' family. Patient was started on Robinul injections Q4H which he has tolerated well.  Given overall prognosis, pt now wanting inpatient hospice.    Patient is currently afebrile, satting in the mid 90's on 2L NC. Blood cultures NGTD, ucx negative. LBM 4/4/17. He will be transitioned to [facility] inpatient hospice on 4/6/17.      Consults     Date and Time Order Name Status Description    3/28/2017 1623 Inpatient Consult to Neurology Completed     3/28/2017 0946 Inpatient Consult to Palliative Care MD Completed     3/25/2017 0110 Inpatient Consult to Cardiology Completed         Pertinent Test Results:    Results from last 7 days  Lab Units 04/03/17  0525   WBC 10*3/mm3 8.62   HEMOGLOBIN g/dL 14.3   HEMATOCRIT % 43.5   PLATELETS 10*3/mm3 126*     Results from last 7 days  Lab Units 04/03/17  0525 04/01/17  0416 03/31/17  2230    SODIUM mmol/L 138 139 138   POTASSIUM mmol/L 4.6 4.1 4.0   CHLORIDE mmol/L 105 103 102   TOTAL CO2 mmol/L 28.0 31.0 25.0   BUN mg/dL 51* 54* 46*   CREATININE mg/dL 1.00 1.00 1.10   GLUCOSE mg/dL 302* 259* 335*   CALCIUM mg/dL 9.3 9.7 9.7     Imaging Results (all)     Procedure Component Value Units Date/Time    XR Chest 1 View [[id number]] Collected:  03/25/17 1216     Updated:  03/25/17 1237    Narrative:          EXAMINATION: XR CHEST, SINGLE VIEW - 03/24/2017     INDICATION: Evaluate tube placement.      COMPARISON: None.     FINDINGS: The cardiac silhouette is slightly large. There is an  extensive perihilar alveolar process consistent with pulmonary edema as  well as an additional right lower lobe area of inflammation. An  endotracheal tube is well-positioned. There is no pneumothorax.           Impression:       Features consistent with pulmonary edema with an additional  right lower lobe inflammatory process. The endotracheal tube is  well-positioned.     DICTATED:     03/25/2017  EDITED:         03/25/2017     This report was finalized on 3/25/2017 12:35 PM by Dr. Jeanmarie Ruiz MD.       CT Head Without Contrast [[id number]] Collected:  03/28/17 1647     Updated:  03/28/17 1729    Narrative:       EXAMINATION: CT HEAD WITHOUT CONTRAST-03/28/2017:      INDICATION: AMS; R13.10-Dysphagia, unspecified.         TECHNIQUE:  CT data set of the brain was performed without intravenous  contrast.     COMPARISON: NONE.     FINDINGS:   1. The foramen magnum is clear. There is no subarachnoid blood or  hemorrhage.  2. There is marked central and peripheral cortical atrophy.  3. Low attenuation periventricular white matter microangiopathy is  noted.  4. There is no mass, edema, hemorrhage or midline shift.  Extracerebral  collection is not identified.  5. Ocular lens are intact and symmetrical. The conal contents are  unremarkable. There is a small air fluid level in the right maxillary  sinus and a large air fluid level is  seen in the sphenoid sinuses.  Mastoids are clear. Calvarium is intact.       Impression:       1.  Marked central and peripheral atrophy with microangiopathy in the  white matter.  2.  Purulent appearing sinusitis with a large air fluid level in the  sphenoid sinuses and a small air fluid level in the right maxillary  sinus.  3.  Otherwise, evolving infarct, hemorrhage, edema, or mass is not  identified. There is no extracerebral collection or midline shift.     This report was finalized on 3/28/2017 5:27 PM by Dr. Asael Butcher MD.       MRI Brain Without Contrast [[id number]] Collected:  03/30/17 0932     Updated:  03/31/17 1029    Narrative:       EXAMINATION: MRI BRAIN WO CONTRAST- 03/30/2017     INDICATION: R13.10-Dysphagia, unspecified; Z74.09-Other reduced  mobility; J96.01-Acute respiratory failure with hypoxia         TECHNIQUE: Routine multiplanar imaging was obtained of the brain without  the administration of gadolinium contrast.     COMPARISON: NONE     FINDINGS: There is atrophy identified of the brain. Some extensive areas  of increased signal seen scattered throughout the periventricular and  subcortical white matter suggesting chronic small vessel ischemic  change. No hemorrhage or hydrocephalus. No abnormal extra-axial fluid  collection is identified. There is mucosal thickening involving the  maxillary sinuses bilaterally. Air-fluid level identified within the  sphenoid sinus. Some mucosal thickening as well seen in the ethmoid air  cells. There is fluid in the mastoid air cells. No cerebellopontine  angle mass identified.  Pituitary and sella are unremarkable.  Craniovertebral junction is preserved. Multiple small lacunar infarcts  seen scattered throughout the basal ganglia. Globes and of orbits are  intact.         Impression:       Acute on chronic paranasal sinusitis with fluid in the  mastoid air cells bilaterally. Extensive chronic small vessel ischemic  changes seen throughout the  periventricular and subcortical white matter  with atrophy present. There is no definite acute intracranial  abnormality identified.     This report was finalized on 3/31/2017 10:27 AM by Dr. Rekha Pacheco MD.           Condition on Discharge: Stable, improved    Physical Exam   Temp: [97.5 °F (36.4 °C)-98.6 °F (37 °C)] 97.7 °F (36.5 °C)  Heart Rate: [60-96] 89  Resp: [16-20] 20  BP: ()/(53-63) 94/53     Physical Exam   Constitutional: no acute distress, laying in bed, does not arouse to voice or touch  Respiratory: Coarse breath sounds throughout, non-labored respirations, Cheyne heard pattern observed   Cardiovascular: RRR, no murmurs, rubs, or gallops, palpable pedal pulses bilaterally  Gastrointestinal: Positive bowel sounds, soft, nontender, nondistended  Musculoskeletal: No bilateral ankle edema   Neurologic: Does not arouse to touch or voice.   Skin: W/D/I. No rash.    Discharge Disposition  Hospice/Medical Facility ([facility])    Discharge Medications   Raymond Blanco   Home Medication Instructions SULMA:[id number]    Printed on:04/06/17 1432   Medication Information                      aspirin 81 MG EC tablet  Take 162 mg by mouth Daily.             donepezil (ARICEPT) 10 MG tablet  Take 10 mg by mouth Every Night.             simvastatin (ZOCOR) 40 MG tablet  Take 40 mg by mouth Daily.               Diet Instructions     Advance Diet As Tolerated                   Activity Instructions     Activity as Tolerated                   No future appointments.    Test Results Pending at Discharge Order Current Status    Blood Culture Preliminary result    Blood Culture Preliminary result           SHERRY Serrato 04/06/17 2:32 PM    Time: Discharge 40 min    Please note that portions of this note may have been completed with a voice recognition program. Efforts were made to edit the dictations, but occasionally words are mistranscribed.

## 2017-04-05 NOTE — PLAN OF CARE
"Problem: Patient Care Overview (Adult)  Goal: Plan of Care Review  Outcome: Ongoing (interventions implemented as appropriate)    04/05/17 1040 04/05/17 1612   Coping/Psychosocial Response Interventions   Plan Of Care Reviewed With --  patient   Patient Care Overview   Progress declining --    Outcome Evaluation   Outcome Summary/Follow up Plan Patient unresponsive to verbal and touch, urnie output dark and concentrated, no mottling noted, not eating or drinking, needs medication for pain, labored breathing and congestion. Dr. Najera ordered Morphine and Robinual. Hospice consulted for transition to [facility]. --        Goal: Adult Individualization and Mutuality  Outcome: Ongoing (interventions implemented as appropriate)    03/30/17 2021 04/05/17 1851   Individualization   Patient Specific Preferences pt son calls him \"Hot Sukhi\" --    Patient Specific Goals --  pain control       Goal: Discharge Needs Assessment    04/03/17 0342   Discharge Needs Assessment   Concerns To Be Addressed adjustment to diagnosis/illness concerns;basic needs concerns;discharge planning concerns   Readmission Within The Last 30 Days no previous admission in last 30 days   Equipment Needed After Discharge none   Discharge Facility/Level Of Care Needs nursing facility, skilled   Current Discharge Risk chronically ill   Discharge Disposition still a patient   Current Health   Anticipated Changes Related to Illness inability to care for self   Self-Care   Equipment Currently Used at Home none   Living Environment   Transportation Available car;ambulance         Problem: Cardiac: Heart Failure (Adult)  Intervention: Promote Functional Ability    03/29/17 0400 04/03/17 2000 04/05/17 1700   Coping/Psychosocial Interventions   Environmental Support --  calm environment promoted --    Activity   Activity Type --  --  activity adjusted per tolerance   Activity Level of Assistance --  --  assistance, 3 or more people   Musculoskeletal Interventions "   Self-Care Promotion independence encouraged --  --        Intervention: Gradually Correct Positive Fluid Balance    04/04/17 2056 04/05/17 1700   Skin Interventions   Skin Protection adhesive use limited --    Positioning   Positioning --  semi-Fowlers       Intervention: Monitor/Manage Cardiac Rhythm Disturbance    04/03/17 1728   Safety Interventions   Emergency Measures airway opened           Problem: Pressure Ulcer (Adult)  Intervention: Promote/Optimize Nutrition    04/05/17 0600   Hygiene Care Assistance   Oral Care lip lubricant applied;oral care provided

## 2017-04-05 NOTE — PROGRESS NOTES
[facility] Medicine Services  INPATIENT PROGRESS NOTE    Date of Admission: 3/24/2017  Length of Stay: 12  Primary Care Physician: Anthony Bryan MD    Subjective-- HPI/Events overnight/ROS/CC- Hospital follow visit.  Detailed ROS not detailed as performed below      Resting comfortably, opens eyes briefly. No family at bedside.      Objective      Temp:  [98.1 °F (36.7 °C)-98.6 °F (37 °C)] 98.4 °F (36.9 °C)  Heart Rate:  [71-77] 73  Resp:  [18-28] 18  BP: ()/(50-63) 118/63    Physical Exam:  Physical Exam     General Assessment: lying in bed, resting, appear in No acute cardiopulmonary distress.     CVS: RRR, S1S2 normal     Resp: Coarse BS throughout, respiration non-labored     Abd: soft, NT, ND, normal BS, no guarding or peritoneal signs     Ext: No edema, both calves are symmetric and NTTP     Neuro: min responsive to soft touch     Skin: W/D/I. No rash.      Results Review:    I have reviewed the labs, radiology results and diagnostic studies.      Results from last 7 days  Lab Units 04/03/17  0525   WBC 10*3/mm3 8.62   HEMOGLOBIN g/dL 14.3   PLATELETS 10*3/mm3 126*       Results from last 7 days  Lab Units 04/03/17  0525   SODIUM mmol/L 138   POTASSIUM mmol/L 4.6   TOTAL CO2 mmol/L 28.0   CREATININE mg/dL 1.00   GLUCOSE mg/dL 302*       Culture Data:  Radiology Data:     I have reviewed the medications.        Assessment/Plan     Assessment/Problem List  This is a 75 yo M with history of COPD, CAD/MI, Mixed CHF, medical therapy noncompliance, and dementia, who was transferred from [facility] on 3/24/2017 after being found down at home.          Problem:  Acute respiratory failure  - multifactorial as below  - s/p intubation to protect airway at OSH, now extubated, on oxygen via NC      Mixed CHF exacerbation  - EF 20%  - cont meds (cardiology s/o 3/30)    Bilat pneumonia  - CXR on 3/28 showed bilat infiltrates with effusions, worse than previous  exam  - Likely HAP, but certainly at risk for pseudomonas as well due to h/o intubation  -  on Vanc and Zosyn/Probiotics for now (4/2)      Pulmonary edema  - diurese as tolerated      Hypoxic ischemic encephalopathy (HIE)/Dementia      COPD (chronic obstructive pulmonary disease)  - not sure if there was any exac on admission, but currently no wheezing or other signs of exac      NSTEMI (non-ST elevated myocardial infarction)  - medical management per cardiology      STARLA (acute kidney injury)  - etiology unclear, resolved      Oropharyngeal dysphagia  - has TF at this time, but I agree that pt is probably aspirating it. Need to discuss long term plans with family, coordinated by Palliative med.   - Prognosis poor, do not recommend PEG, family in agreement.   -TF now being held 2nd to concerns for aspiration    Maxillary sinusitis  - Already on Abx as above for pneumonia      Subclinical hypothyroidism  - TSH elevate with normal T4  - likely due to critical illness  - F/U in OP with PCP      DM2  - A1c 7.2  - insulin/FSBS discontinued      DVT prophylaxis: Hep Sq    Plan:   -TF remain on hold given concerns for aspiration  -Hospice planning discussions with son today, ? Wanting inpt hospice closer to home  -[facility] per Dr. Najera, palliative care, appreciate help with care    Casie M Mayne, PA   04/05/17   10:16 AM    Please note that portions of this note may have been completed with a voice recognition program. Efforts were made to edit the dictations, but occasionally words are mistranscribed.

## 2017-04-05 NOTE — SIGNIFICANT NOTE
Palliative Team Meeting  Dr. Rolo Siu, [facility], [facility],   Lois Pineda, RN  Kayley De La Cruz, STEFF/ [facility] Hospice

## 2017-04-05 NOTE — PROGRESS NOTES
"Palliative Care Progress Note    Date of Admission: 3/24/2017    Subjective:  Patient continues to be minimally responsive and nonverbal.  Increasing upper airway secretions noted  No current facility-administered medications on file prior to encounter.      No current outpatient prescriptions on file prior to encounter.       Pharmacy to dose vancomycin      •  acetaminophen **OR** acetaminophen  •  dextrose  •  dextrose  •  dextrose  •  glucagon (human recombinant)  •  glucagon (human recombinant)  •  ipratropium-albuterol  •  ondansetron **OR** ondansetron  •  piperacillin-tazobactam **AND** [COMPLETED] vancomycin **AND** Pharmacy to dose vancomycin  •  potassium chloride **OR** potassium chloride **OR** potassium chloride  •  sodium chloride  •  sodium chloride    Objective: /63  Pulse 73  Temp 98.4 °F (36.9 °C) (Oral)   Resp 18  Ht 66\" (167.6 cm)  Wt 146 lb 14.4 oz (66.6 kg)  SpO2 96%  BMI 23.71 kg/m2     Intake/Output Summary (Last 24 hours) at 04/05/17 1250  Last data filed at 04/05/17 0944   Gross per 24 hour   Intake            432.5 ml   Output                0 ml   Net            432.5 ml     Physical Exam:      General Appearance:    only occasionally opens eyes to stimuli, nonverbal, noncommunicative    Head:    Normocephalic, without obvious abnormality, atraumatic   Eyes:            Lids and lashes normal, conjunctivae and sclerae normal, no   icterus, no pallor, corneas clear, PERRLA   Ears:    Ears appear intact with no abnormalities noted   Throat:   No oral lesions, no thrush, oral mucosa moist   Neck:   No adenopathy, supple, trachea midline, no thyromegaly, no   carotid bruit, no JVD   Back:     No kyphosis present, no scoliosis present, no skin lesions,      erythema or scars, no tenderness to percussion or                   palpation,   range of motion normal   Lungs:     upper airway secretions audible     Heart:    Regular rhythm and normal rate, normal S1 and S2, no            " murmur, no gallop, no rub, no click   Chest Wall:    No abnormalities observed   Abdomen:     Normal bowel sounds, no masses, no organomegaly, soft        non-tender, non-distended, no guarding, no rebound                tenderness   Rectal:     Deferred   Extremities:   no edema noted    Pulses:   Pulses palpable and equal bilaterally   Skin:   No bleeding, bruising or rash   Lymph nodes:   No palpable adenopathy           Results from last 7 days  Lab Units 04/03/17  0525   WBC 10*3/mm3 8.62   HEMOGLOBIN g/dL 14.3   HEMATOCRIT % 43.5   PLATELETS 10*3/mm3 126*       Results from last 7 days  Lab Units 04/03/17  0525   SODIUM mmol/L 138   POTASSIUM mmol/L 4.6   CHLORIDE mmol/L 105   TOTAL CO2 mmol/L 28.0   BUN mg/dL 51*   CREATININE mg/dL 1.00   CALCIUM mg/dL 9.3   GLUCOSE mg/dL 302*       Impression: nstemi  Respiratory failure, hypoxic  Confusion  Goals of care  Upper airway secretions  Plan: In respect to patient's symptoms I will go ahead and add scheduled Robinul.  Hospice is currently discussing with family about inpatient hospice whether it's at this hospital or at a hospice facility closer to family.          Rolo Najera,   04/05/17  12:50 PM

## 2017-04-06 PROBLEM — J96.01 ACUTE RESPIRATORY FAILURE WITH HYPOXIA (HCC): Status: ACTIVE | Noted: 2017-01-01

## 2017-04-06 NOTE — PLAN OF CARE
Problem: Patient Care Overview (Adult)  Goal: Plan of Care Review  Outcome: Ongoing (interventions implemented as appropriate)    04/06/17 0448   Coping/Psychosocial Response Interventions   Plan Of Care Reviewed With patient   Patient Care Overview   Progress declining   Outcome Evaluation   Outcome Summary/Follow up Plan pt unresponsive to touch and verbal, pt urine dark yellow adequate, pain well controlled, labored breathing and apnic periods present.        Goal: Adult Individualization and Mutuality  Outcome: Ongoing (interventions implemented as appropriate)  Goal: Discharge Needs Assessment  Outcome: Ongoing (interventions implemented as appropriate)    Problem: Cardiac: Heart Failure (Adult)  Goal: Signs and Symptoms of Listed Potential Problems Will be Absent or Manageable (Cardiac: Heart Failure)  Outcome: Ongoing (interventions implemented as appropriate)    Problem: Pressure Ulcer (Adult)  Goal: Signs and Symptoms of Listed Potential Problems Will be Absent or Manageable (Pressure Ulcer)  Outcome: Ongoing (interventions implemented as appropriate)    Problem: Dying Patient, Actively (Adult)  Goal: Identify Related Risk Factors and Signs and Symptoms  Outcome: Ongoing (interventions implemented as appropriate)  Goal: Comfort/Pain Control  Outcome: Ongoing (interventions implemented as appropriate)  Goal: Dying Process, Peace and Dignity  Outcome: Ongoing (interventions implemented as appropriate)

## 2017-04-06 NOTE — PROGRESS NOTES
[facility] Medicine Services  INPATIENT PROGRESS NOTE    Date of Admission: 3/24/2017  Length of Stay: 13  Primary Care Physician: Anthony Bryan MD    Subjective-- HPI/Events overnight/ROS/CC- Hospital follow visit.  Detailed ROS not detailed as performed below      Resting comfortably in bed, does not arouse during exam. Cheyne-heard breathing pattern noted. Per nursing, patient did well, receiving Robinul injections Q4H. [facility] to reassess today for inpatient hospice eligibility.     Objective      Temp:  [97.5 °F (36.4 °C)-98.6 °F (37 °C)] 97.7 °F (36.5 °C)  Heart Rate:  [60-96] 89  Resp:  [16-20] 20  BP: ()/(53-63) 94/53    Physical Exam   Constitutional: no acute distress, laying in bed, does not arouse to voice or touch  Respiratory: Coarse breath sounds throughout, non-labored respirations, Cheyne heard pattern observed   Cardiovascular: RRR, no murmurs, rubs, or gallops, palpable pedal pulses bilaterally  Gastrointestinal: Positive bowel sounds, soft, nontender, nondistended  Musculoskeletal: No bilateral ankle edema   Neurologic: Does not arouse to touch or voice.   Skin: W/D/I. No rash.    Results Review:    I have reviewed the labs, radiology results and diagnostic studies.    Results from last 7 days  Lab Units 04/03/17  0525   WBC 10*3/mm3 8.62   HEMOGLOBIN g/dL 14.3   HEMATOCRIT % 43.5   PLATELETS 10*3/mm3 126*     Results from last 7 days  Lab Units 04/03/17  0525 04/01/17  0416 03/31/17  0340   SODIUM mmol/L 138 139 138   POTASSIUM mmol/L 4.6 4.1 4.0   CHLORIDE mmol/L 105 103 102   TOTAL CO2 mmol/L 28.0 31.0 25.0   BUN mg/dL 51* 54* 46*   CREATININE mg/dL 1.00 1.00 1.10   GLUCOSE mg/dL 302* 259* 335*   CALCIUM mg/dL 9.3 9.7 9.7     I have reviewed the medications.    Assessment/Plan     Assessment/Problem List  This is a 75 yo M with history of COPD, CAD/MI, Mixed CHF, medical therapy noncompliance, and dementia, who was transferred from  [facility] on 3/24/2017 after being found down at home.     Acute respiratory failure  - multifactorial as below  - s/p intubation to protect airway at OSH, now extubated, on oxygen via NC      Mixed CHF exacerbation  - EF 20%  - cont meds (cardiology s/o 3/30)    Bilat pneumonia  - CXR on 3/28 showed bilat infiltrates with effusions, worse than previous exam  - Likely HAP, but certainly at risk for pseudomonas as well due to h/o intubation  -  on Vanc and Zosyn/Probiotics for now (4/2)      Pulmonary edema  - diurese as tolerated      Hypoxic ischemic encephalopathy (HIE)/Dementia      COPD (chronic obstructive pulmonary disease)  - not sure if there was any exac on admission, but currently no wheezing or other signs of exac      NSTEMI (non-ST elevated myocardial infarction)  - medical management per cardiology      STARLA (acute kidney injury)  - etiology unclear, resolved      Oropharyngeal dysphagia  - has TF at this time, but I agree that pt is probably aspirating it. Need to discuss long term plans with family, coordinated by Palliative med.   - Prognosis poor, do not recommend PEG, family in agreement.   -TF now being held 2nd to concerns for aspiration    Maxillary sinusitis  - Already on Abx as above for pneumonia      Subclinical hypothyroidism  - TSH elevate with normal T4  - likely due to critical illness  - F/U in OP with PCP      DM2  - A1c 7.2  - insulin/FSBS discontinued      DVT prophylaxis: Hep Sq    Plan:   - Tube feedings on hold given concerns for aspiration. No PO intake otherwise.   - [facility] to reevaluate patient this morning for inpatient hospice eligibility. Will reassess if patient does not meet criteria.   -Robinul Q4H per Dr. Najera, palliative care, appreciate assistance with care of Mr. Blanco     SHERRY Serrato   04/06/17   8:39 AM    Please note that portions of this note may have been completed with a voice recognition program. Efforts were  made to edit the dictations, but occasionally words are mistranscribed.

## 2017-04-06 NOTE — H&P
Hospice H&P     Attending Provider: Rolo Najera DO    Code Status: Comfort Measures and Allow Natural Death (A-N-D)       Subjective   HPI:   Raymond Blanco is a 76 y.o. male admitted on 3/24/17 with acute respiratory failure. He was found down at home with noted emesis. He was taken to local hospital, intubated, and transported by air to [facility]. 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 Pt was successfully extubated, but remained rather unresponsive due to likely hypoxic encephalopathy/ROLY. He was seen by cardiology, neurology, and palliative care. He had ongoing dysphagia and family opted to not pursue PEG placement. TFs had been held 2/2 aspiration. He was initially going to be transferred to [facility] in [place], but was determined not to meet their eligibility guidelines. He was then admitted to inpt hospice at [facility].     No family at bedside.     Pt is sleeping, appears comfortable.      ROS:   Negative except as above in HPI.       Past Medical History:   Diagnosis Date   • CHF (congestive heart failure)    • Coronary artery disease    • Emphysema of lung    • TIA (transient ischemic attack)     some time after CABG approx 20 years ago     Past Surgical History:   Procedure Laterality Date   • CARDIAC SURGERY      CABG approx 20 years ago   • CORONARY ARTERY BYPASS GRAFT     • HERNIA REPAIR       Social History     Social History   • Marital status:      Spouse name: N/A   • Number of children: N/A   • Years of education: N/A     Occupational History   • Not on file.     Social History Main Topics   • Smoking status: Former Smoker     Packs/day: 2.00     Years: 40.00     Types: Cigarettes     Quit date: 3/25/1997   • Smokeless tobacco: Never Used   • Alcohol use No   • Drug use: No   • Sexual activity: Defer     Other Topics Concern   • Not on file     Social  History Narrative     Family History   Problem Relation Age of Onset   • No Known Problems Mother    • No Known Problems Father    • Diabetes Sister    • Diabetes Brother        No Known Allergies    Current Facility-Administered Medications   Medication Dose Route Frequency Provider Last Rate Last Dose   • glycopyrrolate (ROBINUL) injection 0.4 mg  0.4 mg Intravenous Q4H Tressa Pak APRN   0.4 mg at 04/06/17 1502   • ipratropium-albuterol (DUO-NEB) nebulizer solution 3 mL  3 mL Nebulization Q4H PRN Tressa Pak APRN       • Morphine sulfate (PF) injection 2 mg  2 mg Intravenous Q1H PRN Tressa Pak APRN       • ondansetron (ZOFRAN) tablet 4 mg  4 mg Oral Q6H PRN Tressa Pak APRLEE ANN        Or   • ondansetron (ZOFRAN) injection 4 mg  4 mg Intravenous Q6H PRN Tressa Pak APRN       • sodium chloride 0.9 % flush 1-10 mL  1-10 mL Intravenous PRN rTessa Pak APRLEE ANN            ipratropium-albuterol  •  Morphine  •  ondansetron **OR** ondansetron  •  sodium chloride    Current medication reviewed for route, type, dose and frequency and are current per MAR at time of dictation.    Objective     There were no vitals taken for this visit.  No intake or output data in the 24 hours ending 04/06/17 1532    PPS: 10%  Physical Examination:      General Appearance:    Chronically ill appearing, actively dying   HEENT:    NC/AT, without obvious abnormality, Pupils sluggish, eyelids resistance met with passive opening    Neck:   flaccid   Lungs:     Shallow breathing, small periods of apnea noted    Heart:    Distant, RRR, no m/r/g   Abdomen:     Soft, NT, ND, bowel sounds hypoactive   Extremities:   No c/c/e   Pulses:   Peripheral pulses not palpable   Skin:   Warm, dry, no mottling noted   Neurologic:   Unresponsive   Psych:   calm         Labs:     Results from last 7 days  Lab Units 04/03/17  0525   WBC 10*3/mm3 8.62   HEMOGLOBIN g/dL 14.3   HEMATOCRIT % 43.5   PLATELETS 10*3/mm3 126*       Results from  last 7 days  Lab Units 04/03/17  0525   SODIUM mmol/L 138   POTASSIUM mmol/L 4.6   CHLORIDE mmol/L 105   TOTAL CO2 mmol/L 28.0   BUN mg/dL 51*   CREATININE mg/dL 1.00   GLUCOSE mg/dL 302*   CALCIUM mg/dL 9.3       Results from last 7 days  Lab Units 04/03/17  0525   SODIUM mmol/L 138   POTASSIUM mmol/L 4.6   CHLORIDE mmol/L 105   TOTAL CO2 mmol/L 28.0   BUN mg/dL 51*   CREATININE mg/dL 1.00   CALCIUM mg/dL 9.3   GLUCOSE mg/dL 302*     Imaging Results (last 72 hours)     ** No results found for the last 72 hours. **          Diagnostics/Clinical Data: Reviewed    Assessment/Plan    Patient Active Problem List   Diagnosis   • CHF exacerbation   • Pulmonary edema   • Acute respiratory failure   • Medically noncompliant   • Congestive heart failure (CHF)   • HLD (hyperlipidemia)   • COPD (chronic obstructive pulmonary disease)   • NSTEMI (non-ST elevated myocardial infarction)   • STARLA (acute kidney injury)   • Hypoxic ischemic encephalopathy (HIE), unspecified   • Dementia   • Oropharyngeal dysphagia   • Acute maxillary sinusitis   • Pneumonia, bilat   • DM (diabetes mellitus)   • Subclinical hypothyroidism   • Acute respiratory failure with hypoxia       Assessment/Problems:   Raymond Blanco is a 76 y.o. yo male with NSTEMI, anoxic brain injury     Goals of Care: To remain comfortable at facility until death  Family/Support/Spiritual: No family present     Plan:    Pt appears comfortable at present.     Continue scheduled robinul started by palliative yesterday. Appears to have significantly improved his secretions. Add atropine gtt PRN.     PRN morphine. Has only used one dose.     PRN ativan for seizures and anxiety both (different does available). He had been on Keppra, but no documented seizures.     Justification: Patient meets criteria for Acute In-patient Care due to ongoing symptom management using IV meds and frequent RN assessment.    Time: 75 mins    Paulette Perez MD  4/6/2017

## 2017-04-06 NOTE — PLAN OF CARE
Problem: Dying Patient, Actively (Adult)  Goal: Dying Process, Peace and Dignity  Outcome: Ongoing (interventions implemented as appropriate)

## 2017-04-06 NOTE — PROGRESS NOTES
Continued Stay Note  [facility]     Patient Name: Raymond Blanco  MRN: [id number]  Today's Date: 4/6/2017    Admit Date: 4/6/2017          Discharge Plan       04/06/17 1340    Case Management/Social Work Plan    Plan Hospice inpatient admission     Additional Comments Chart reviewed. Patient referred to inpatient hospice services after [facility] declined to accept patient to their service area's acute care center.  They report that patient without active symptoms.  It was explained to that particular hospice liasion that patient has been on comfort meds for a few days.  Patient's sons in to see patient and would like for patient to receive hospice services inpatient at [facility] if he cannot transfer closer to home.  Patient flipped to inpatient hospice.  Please call [phone] for assistance.              Discharge Codes     None            Brandy Samuels RN

## 2017-04-07 NOTE — PLAN OF CARE
Problem: Dying Patient, Actively (Adult)  Goal: Identify Related Risk Factors and Signs and Symptoms  Outcome: Ongoing (interventions implemented as appropriate)

## 2017-04-07 NOTE — PLAN OF CARE
Problem: Patient Care Overview (Adult)  Goal: Plan of Care Review  Outcome: Ongoing (interventions implemented as appropriate)    04/07/17 0418   Coping/Psychosocial Response Interventions   Plan Of Care Reviewed With patient   Patient Care Overview   Progress declining   Outcome Evaluation   Outcome Summary/Follow up Plan Pt unresponsive through out the night, pt R.R has decreased AND comfort care       Goal: Adult Individualization and Mutuality  Outcome: Ongoing (interventions implemented as appropriate)  Goal: Discharge Needs Assessment  Outcome: Ongoing (interventions implemented as appropriate)    Problem: Dying Patient, Actively (Adult)  Goal: Identify Related Risk Factors and Signs and Symptoms  Outcome: Ongoing (interventions implemented as appropriate)  Goal: Comfort/Pain Control  Outcome: Ongoing (interventions implemented as appropriate)  Goal: Dying Process, Peace and Dignity  Outcome: Ongoing (interventions implemented as appropriate)    Problem: Pain, Acute (Adult)  Goal: Identify Related Risk Factors and Signs and Symptoms  Outcome: Ongoing (interventions implemented as appropriate)  Goal: Acceptable Pain Control/Comfort Level  Outcome: Ongoing (interventions implemented as appropriate)

## 2017-04-07 NOTE — PROGRESS NOTES
"Hospice Progress Note    Code Status: Comfort Measures and Allow Natural Death (A-N-D)     Subjective   S: Medical record reviewed, follow up visit for sx mgmt. Events noted. Pt appears comfortable and remains unresponsive at present. No family present.     ROS:   Negative except as above.    PMH/PSH/PFH: Reviewed, no changes    No Known Allergies    Current Facility-Administered Medications   Medication Dose Route Frequency Provider Last Rate Last Dose   • atropine 1 % ophthalmic solution 2 drop  2 drop Sublingual Q1H PRN Paulette Perez MD       • glycopyrrolate (ROBINUL) injection 0.4 mg  0.4 mg Intravenous Q4H UMU Correia   0.4 mg at 04/07/17 0916   • ipratropium-albuterol (DUO-NEB) nebulizer solution 3 mL  3 mL Nebulization Q4H PRN UMU Correia       • ketorolac (TORADOL) injection 15 mg  15 mg Intravenous Q6H PRN Paulette Perez MD       • LORazepam (ATIVAN) injection 0.5 mg  0.5 mg Intravenous Q4H PRN Paulette Perez MD   0.5 mg at 04/06/17 2331   • LORazepam (ATIVAN) injection 2 mg  2 mg Intravenous Q30 Min PRN Paulette Perez MD       • Morphine sulfate (PF) injection 2 mg  2 mg Intravenous Q1H PRN UMU Correia       • ondansetron (ZOFRAN) tablet 4 mg  4 mg Oral Q6H PRN UMU Correia        Or   • ondansetron (ZOFRAN) injection 4 mg  4 mg Intravenous Q6H PRN UMU Correia       • palliative care oral rinse   Mouth/Throat PRN Paulette Perez MD       • sodium chloride 0.9 % flush 1-10 mL  1-10 mL Intravenous PRN UMU Correia         Infusions:       Current medication reviewed for route, type, dose and frequency and are current per MAR at time of dictation.    Objective   BP (!) 71/45 Comment: nurse notified  Pulse 88  Temp 97.2 °F (36.2 °C) (Oral)   Resp 24  Ht 66\" (167.6 cm)  Wt 146 lb 14.4 oz (66.6 kg)  SpO2 96%  BMI 23.71 kg/m2  No intake or output data in the 24 hours ending 04/07/17 1101    PPS: 10%  Physical Examination: "   General Appearance:    Chronically ill appearing, actively dying   HEENT:    NC/AT, without obvious abnormality, Pupils sluggish, eyelids initially resists passive eye opening but then eyelids remain open afterwards    Neck:   flaccid   Lungs:     Shallow respirations, small periods of apnea 2-4 secs    Heart:    Distant, RRR, no m/r/g   Abdomen:     Soft, NT, ND, bowel sounds hypoactive   Extremities:   No c/c/e   Pulses:   Pedal pulses not palpable   Skin:   Warm, dry, not mottled   Neurologic:   Nonresponsive   Psych:   calm         Labs/Diagnostics/Clinical Data: Reviewed.    Assessment/Plan    Patient Active Problem List   Diagnosis   • CHF exacerbation   • Pulmonary edema   • Acute respiratory failure   • Medically noncompliant   • Congestive heart failure (CHF)   • HLD (hyperlipidemia)   • COPD (chronic obstructive pulmonary disease)   • NSTEMI (non-ST elevated myocardial infarction)   • STARLA (acute kidney injury)   • Hypoxic ischemic encephalopathy (HIE), unspecified   • Dementia   • Oropharyngeal dysphagia   • Acute maxillary sinusitis   • Pneumonia, bilat   • DM (diabetes mellitus)   • Subclinical hypothyroidism   • Acute respiratory failure with hypoxia       Assessment:   Raymond Blanco is a 76 y.o. yo male with NSTEMI, anoxic brain injury    Goals of Care: Comfort measures only  Family/Support/Spiritual: No family present; [place]     Plan:     Pt appears comfortable at present.     Remains on scheduled robinul with improvement in secretions from a few days prior. No atropine gtt required.    Received one PRN dose of ativan 0.5mg overnight. No seizure activity but has PRN seizure dose ativan available as well since Keppra was discontinued.      Has PRN morphine ordered but no doses required.   Justification: Patient continues to meet criteria for Acute In-patient Care due to ongoing sx mgmt using scheduled IV meds and frequent RN assessment.    Time: 15  mins    Paulette Perez MD  4/7/2017

## 2017-04-07 NOTE — THERAPY DISCHARGE NOTE
Acute Care - Physical Therapy Discharge Summary  [facility]       Patient Name: Raymond Blanco  : 1940  MRN: [id number]    Today's Date: 2017  Onset of Illness/Injury or Date of Surgery Date: 17    Date of Referral to PT: 17  Referring Physician: MD Daniela      Admit Date: 3/24/2017      PT Recommendation and Plan    Visit Dx:    ICD-10-CM ICD-9-CM   1. Dysphagia, unspecified type R13.10 787.20   2. Impaired mobility and ADLs Z74.09 799.89   3. Impaired functional mobility, balance, gait, and endurance Z74.09 V49.89   4. Acute respiratory failure with hypoxia J96.01 518.81                       IP PT Goals       17 1600 17 1030       Bed Mobility PT LTG    Bed Mobility PT LTG, Date Established 17  -LS      Bed Mobility PT LTG, Time to Achieve 2 wks  -LS      Bed Mobility PT LTG, Activity Type supine to sit/sit to supine;roll left/roll right  -LS      Bed Mobility PT LTG, [place] Level moderate assist (50% patient effort);2 person assist required  -LS      Static Sitting Balance PT LTG    Static Sitting Balance PT LTG, Date Established 17  -LS      Static Sitting Balance PT LTG, Time to Achieve 2 wks  -LS      Static Sitting Balance PT LTG, [place] Level minimum assist (75% patient effort)  -LS      Static Sitting Balance PT LTG, Assist Device UE Support  -LS      Caregiver Training PT LTG    Caregiver Training PT LTG, Date Established 17  -LS      Caregiver Training PT LTG, Time to Achieve 2 wks  -LS      Caregiver Training PT LTG, Activity Type Family will verbalize understanding of ROM HEP and proper positioning for pt.   -LS      Wound Care PT STG    Wound Care PT STG 1, Date Established  17  -MF     Wound Care PT STG 1, Time to Achieve  1 day  -MF     Wound Care PT STG 1, Location  BLEs  -MF     Wound Care PT STG 1, Additional Goal  Pt to be fitted with heelmedex boot for improve skin integrity and to limit complications from foot drop.     -MF     Wound Care PT STG 1, Outcome  goal met  -       User Key  (r) = Recorded By, (t) = Taken By, (c) = Cosigned By    Initials Name Provider Type    MINI Wilde, PT Physical Therapist    PATRICIA Talavera, PT Physical Therapist           Goals Status: Treatment plan discontinued secondary to discharge from acute facility.      PT Discharge Summary  Reason for Discharge: Discharge from facility  Outcomes Achieved: Refer to plan of care for updates on goals achieved  Discharge Destination: other (comment) (IP hospice)      Gissel Saini, PT   4/7/2017

## 2017-04-07 NOTE — PROGRESS NOTES
Continued Stay Note  [facility]     Patient Name: Raymond Blanco  MRN: [id number]  Today's Date: 4/7/2017    Admit Date: 4/6/2017          Discharge Plan       04/07/17 1110    Case Management/Social Work Plan    Plan inpatient hospice note    Additional Comments chart reviewed. Unresponsive. Appears comfort.  Face and body are relaxed.  Breathing is regular, even, and unlabored.  RR 10.  Bilateral lower extremities cold and slight mottling noted to bilateral knees.  Family is not present at this time.  Please call hospice team at [phone] for symptom issues.              Discharge Codes     None            Brandy Samuels RN

## 2017-04-08 NOTE — PLAN OF CARE
Problem: Patient Care Overview (Adult)  Goal: Plan of Care Review  Outcome: Ongoing (interventions implemented as appropriate)    04/08/17 0424   Coping/Psychosocial Response Interventions   Plan Of Care Reviewed With patient   Patient Care Overview   Progress no change   Outcome Evaluation   Outcome Summary/Follow up Plan pt. nonverbal. will open eyes with tactile stilmulation. comfort care given and po care. turned. and bathed.        Goal: Adult Individualization and Mutuality  Outcome: Ongoing (interventions implemented as appropriate)  Goal: Discharge Needs Assessment  Outcome: Ongoing (interventions implemented as appropriate)    Problem: Dying Patient, Actively (Adult)  Goal: Identify Related Risk Factors and Signs and Symptoms  Outcome: Outcome(s) achieved Date Met:  04/08/17  Goal: Comfort/Pain Control  Outcome: Ongoing (interventions implemented as appropriate)  Goal: Dying Process, Peace and Dignity  Outcome: Ongoing (interventions implemented as appropriate)    Problem: Pain, Acute (Adult)  Goal: Identify Related Risk Factors and Signs and Symptoms  Outcome: Outcome(s) achieved Date Met:  04/08/17  Goal: Acceptable Pain Control/Comfort Level  Outcome: Ongoing (interventions implemented as appropriate)

## 2017-04-08 NOTE — PROGRESS NOTES
"Hospice Progress Note    Code Status: Comfort Measures and Allow Natural Death (A-N-D)     Subjective   S: Medical record reviewed, follow up visit for sx mgmt. Events noted. No family present. Pt with some s/s of mild discomfort; furrowed brow.     ROS: Unable to obtain r/t AMS/condition    PMH/PSH/PFH: Reviewed, no changes    No Known Allergies    Current Facility-Administered Medications   Medication Dose Route Frequency Provider Last Rate Last Dose   • atropine 1 % ophthalmic solution 2 drop  2 drop Sublingual Q1H PRN Paulette Perez MD       • glycopyrrolate (ROBINUL) injection 0.4 mg  0.4 mg Intravenous Q4H [facility]   0.4 mg at 04/08/17 0852   • ipratropium-albuterol (DUO-NEB) nebulizer solution 3 mL  3 mL Nebulization Q4H PRN [facility]       • ketorolac (TORADOL) injection 15 mg  15 mg Intravenous Q6H PRN Paulette Perez MD       • LORazepam (ATIVAN) injection 0.5 mg  0.5 mg Intravenous Q4H PRN Paulette Perez MD   0.5 mg at 04/06/17 2331   • LORazepam (ATIVAN) injection 2 mg  2 mg Intravenous Q30 Min PRN Paulette Perez MD       • Morphine sulfate (PF) injection 2 mg  2 mg Intravenous Q1H PRN [facility]       • ondansetron (ZOFRAN) tablet 4 mg  4 mg Oral Q6H PRN [facility]        Or   • ondansetron (ZOFRAN) injection 4 mg  4 mg Intravenous Q6H PRN [facility]       • palliative care oral rinse   Mouth/Throat PRN Paulette Perez MD       • sodium chloride 0.9 % flush 1-10 mL  1-10 mL Intravenous PRN [facility]         Infusions:       Current medication reviewed for route, type, dose and frequency and are current per MAR at time of dictation.    Objective   BP (!) 85/46  Pulse 78  Temp 98.3 °F (36.8 °C) (Axillary)   Resp 16  Ht 66\" (167.6 cm)  Wt 146 lb 14.4 oz (66.6 kg)  SpO2 99%  BMI 23.71 kg/m2  No intake or output data in the 24 hours ending 04/08/17 1108    PPS: 10%  Physical Examination:   General Appearance:  "   Chronically ill appearing, actively dying   HEENT:    NC/AT, without obvious abnormality, eyes partially open, does not focus or track   Neck:   flaccid   Lungs:     Few scattered rhonchi bilat, respirations shallow, even    Heart:    Distant, RRR, no m/r/g   Abdomen:     Soft, NT, ND, bowel sounds hypoactive   Extremities:   No c/c/e   Skin:   Warm, dry, no mottling noted   Neurologic:   Nonresponsive   Psych:   calm         Labs/Diagnostics/Clinical Data: Reviewed.    Assessment/Plan    Patient Active Problem List   Diagnosis   • CHF exacerbation   • Pulmonary edema   • Acute respiratory failure   • Medically noncompliant   • Congestive heart failure (CHF)   • HLD (hyperlipidemia)   • COPD (chronic obstructive pulmonary disease)   • NSTEMI (non-ST elevated myocardial infarction)   • STARLA (acute kidney injury)   • Hypoxic ischemic encephalopathy (HIE), unspecified   • Dementia   • Oropharyngeal dysphagia   • Acute maxillary sinusitis   • Pneumonia, bilat   • DM (diabetes mellitus)   • Subclinical hypothyroidism   • Acute respiratory failure with hypoxia       Assessment:   Raymond Blanco is a 76 y.o. yo male with NSTEMI, anoxic brain injury    Goals of Care: Comfort measures only  Family/Support/Spiritual: No family present    Plan:    1. Resp congestion-improving-continue sched robinul as appears effective, no prns in past 24H.  2. Agitation/Anxiety-continue prn ativan as appears effective, no prn in past 24h  3. Dyspnea/pain- prn morphine available-pt with some mild facial grimacing and slightly labored breathing, will ask nursing to administer prn.   4. Xerostomia- oral care qid and prn    No medication adjustments indicated at this time.  Pt with prns available for symptom management, with minimal use.  RN to medicate this am for comfort. Discussed with hospice RN. Please call for needs.       Justification: Patient continues to meet criteria for Acute In-patient Care due to ongoing sx mgmt using scheduled IV  meds and frequent RN assessment.    Time: 10 mins    Merly Cuellar, APRN  4/8/2017

## 2017-04-08 NOTE — PROGRESS NOTES
Continued Stay Note  [facility]     Patient Name: Raymond Blanco  MRN: [id number]  Today's Date: 4/8/2017    Admit Date: 4/6/2017          Discharge Plan       04/08/17 0949    Case Management/Social Work Plan    Plan inpatient hospice note    Additional Comments chart reviewed. Pt unresponsive.  Appears comfortable.  Breathing is regular, even, and unlabored.  No changes in assessment from yesterday.  Please call [phone] for concerns/assistance.  Hospice team is available daily from 8-5              Discharge Codes     None            Brandy Samuels RN

## 2017-04-08 NOTE — PLAN OF CARE
Problem: Dying Patient, Actively (Adult)  Goal: Comfort/Pain Control  Outcome: Ongoing (interventions implemented as appropriate)  Goal: Dying Process, Peace and Dignity  Outcome: Ongoing (interventions implemented as appropriate)

## 2017-04-09 NOTE — PLAN OF CARE
Problem: Patient Care Overview (Adult)  Goal: Plan of Care Review  Outcome: Ongoing (interventions implemented as appropriate)    04/09/17 050   Coping/Psychosocial Response Interventions   Plan Of Care Reviewed With family   Outcome Evaluation   Outcome Summary/Follow up Plan patient non responsive, will open eyes slightly , no respiratory distress       Goal: Adult Individualization and Mutuality  Outcome: Ongoing (interventions implemented as appropriate)  Goal: Discharge Needs Assessment  Outcome: Ongoing (interventions implemented as appropriate)    Problem: Dying Patient, Actively (Adult)  Goal: Comfort/Pain Control  Outcome: Ongoing (interventions implemented as appropriate)  Goal: Dying Process, Peace and Dignity  Outcome: Ongoing (interventions implemented as appropriate)    Problem: Pain, Acute (Adult)  Goal: Acceptable Pain Control/Comfort Level  Outcome: Ongoing (interventions implemented as appropriate)

## 2017-04-09 NOTE — PROGRESS NOTES
Hospice Progress Note    Code Status: Comfort Measures and Allow Natural Death (A-N-D)     Subjective   S: Medical record reviewed, follow up visit for sx mgmt. Events noted. No family present. Pt with 2 prn morphine in past 24H.  Unable to make needs known. RN reports no change.    ROS: Unable to obtain r/t AMS/condition    PMH/PSH/PFH: Reviewed, no changes    No Known Allergies    Current Facility-Administered Medications   Medication Dose Route Frequency Provider Last Rate Last Dose   • artificial tears (PURALUBE) ophthalmic ointment   Both Eyes TID [facility]       • atropine 1 % ophthalmic solution 2 drop  2 drop Sublingual Q1H PRN Paulette Perez MD       • glycopyrrolate (ROBINUL) injection 0.4 mg  0.4 mg Intravenous Q8H Merly Cuellar APRLEE ANN       • ipratropium-albuterol (DUO-NEB) nebulizer solution 3 mL  3 mL Nebulization Q4H PRN [facility]       • ketorolac (TORADOL) injection 15 mg  15 mg Intravenous Q6H PRN Paulette Perez MD       • LORazepam (ATIVAN) injection 0.5 mg  0.5 mg Intravenous Q4H PRN Paulette Perez MD   0.5 mg at 04/06/17 2331   • LORazepam (ATIVAN) injection 2 mg  2 mg Intravenous Q30 Min PRN Paulette Perez MD       • Morphine sulfate (PF) injection 1 mg  1 mg Intravenous Q6H [facility]       • Morphine sulfate (PF) injection 2 mg  2 mg Intravenous Q1H PRN [facility]   2 mg at 04/08/17 1615   • ondansetron (ZOFRAN) tablet 4 mg  4 mg Oral Q6H PRN [facility]        Or   • ondansetron (ZOFRAN) injection 4 mg  4 mg Intravenous Q6H PRN [facility]       • palliative care oral rinse   Mouth/Throat PRN Paulette Perez MD       • palliative care oral rinse   Mouth/Throat 4x Daily Merly Cuellar APRN   5 mL at 04/09/17 0849   • sodium chloride 0.9 % flush 1-10 mL  1-10 mL Intravenous PRN [facility]         Infusions:       Current medication reviewed for route, type, dose and  "frequency and are current per MAR at time of dictation.    Objective   BP (!) 71/47 Comment: nurse notified  Pulse 86  Temp 97 °F (36.1 °C) (Oral)   Resp 24  Ht 66\" (167.6 cm)  Wt 146 lb 14.4 oz (66.6 kg)  SpO2 97%  BMI 23.71 kg/m2  No intake or output data in the 24 hours ending 04/09/17 1141    PPS: 10%  Physical Examination:   General Appearance:    Chronically ill appearing, actively dying   HEENT:    NC/AT, without obvious abnormality, eyes closed, scant drainage noted both eyes   Neck:   flaccid   Lungs:     CTA, diminished in bases, respirations shallow, even rate 20    Heart:    Distant, RRR, no m/r/g   Abdomen:     Soft, NT, ND, bowel sounds hypoactive   Extremities:   No c/c/e   Skin:   Warm, dry, no mottling noted   Neurologic:   Nonresponsive   Psych:   calm         Labs/Diagnostics/Clinical Data: Reviewed.    Assessment/Plan    Patient Active Problem List   Diagnosis   • CHF exacerbation   • Pulmonary edema   • Acute respiratory failure   • Medically noncompliant   • Congestive heart failure (CHF)   • HLD (hyperlipidemia)   • COPD (chronic obstructive pulmonary disease)   • NSTEMI (non-ST elevated myocardial infarction)   • STARLA (acute kidney injury)   • Hypoxic ischemic encephalopathy (HIE), unspecified   • Dementia   • Oropharyngeal dysphagia   • Acute maxillary sinusitis   • Pneumonia, bilat   • DM (diabetes mellitus)   • Subclinical hypothyroidism   • Acute respiratory failure with hypoxia       Assessment:   Raymond Blanco is a 76 y.o. yo male with NSTEMI, anoxic brain injury    Goals of Care: Comfort measures only  Family/Support/Spiritual: No family present    Plan:    1. Resp congestion-reduced atc frequency, no prns in past 24H.  2. Agitation/Anxiety-continue prn ativan as appears effective, no prn in past 24h  3. Dyspnea/pain- sched morphine 1mg q6h.   4. Dry MM/Xerostomia- oral and Eye care     Weaned down O2 with plans to DC later today, sched low dose morphine r/t prn use and pt " unable to make needs known.  Discussed with RN who denies needs at present. Discussed with hospice RN. Please call for needs.       Justification: Patient continues to meet criteria for Acute In-patient Care due to ongoing sx mgmt using scheduled IV meds and frequent RN assessment.    Time: 10 mins    Merly Cuellar, APRN  4/9/2017

## 2017-04-09 NOTE — PROGRESS NOTES
Continued Stay Note  [facility]     Patient Name: Raymond Blanco  MRN: [id number]  Today's Date: 4/9/2017    Admit Date: 4/6/2017          Discharge Plan       04/09/17 1119    Case Management/Social Work Plan    Plan inpatient hospice note.    Additional Comments chart reviewed. Patient unresponsive.  Appears comfortable.  Breathing is regular, even, and unlabored.  Hospice NP to add scheduled morphine to medication profile to promote comfort. Please call [phone] for concerns/assistance.              Discharge Codes     None            Brandy Samuels RN

## 2017-04-09 NOTE — PROGRESS NOTES
Adult Nutrition  Assessment/PES    Patient Name:  Raymond Blanco  YOB: 1940  MRN: [id number]  Admit Date:  4/6/2017    Assessment Date:  4/9/2017        Reason for Assessment       04/09/17 1149    Reason for Assessment    Reason For Assessment/Visit NPO/Clr Policy   today is day 3 of NPO/no orders. Noted that pt is unresponsive and now inpt hospice.                              Problem/Interventions:                        Education/Evaluation       04/09/17 1150    Monitor/Evaluation    Monitor --   sign off;Consult RD if change of status/POC.        Comments:      Electronically signed by:  Ceci Garcia MS,RD,LD  04/09/17 11:52 AM

## 2017-04-09 NOTE — PLAN OF CARE
Problem: Patient Care Overview (Adult)  Goal: Plan of Care Review  Outcome: Ongoing (interventions implemented as appropriate)    04/09/17 4950   Outcome Evaluation   Outcome Summary/Follow up Plan nonresponsive. po care every 2 hrs given. nad noted.        Goal: Adult Individualization and Mutuality  Outcome: Ongoing (interventions implemented as appropriate)  Goal: Discharge Needs Assessment  Outcome: Ongoing (interventions implemented as appropriate)    Problem: Dying Patient, Actively (Adult)  Goal: Comfort/Pain Control  Outcome: Ongoing (interventions implemented as appropriate)  Goal: Dying Process, Peace and Dignity  Outcome: Ongoing (interventions implemented as appropriate)    Problem: Pain, Acute (Adult)  Goal: Acceptable Pain Control/Comfort Level  Outcome: Ongoing (interventions implemented as appropriate)

## 2017-04-10 NOTE — DISCHARGE SUMMARY
Date of Death:  4/10/2017  Time of Death:  1235    Presenting Problem/History of Present Illness   CHF exacerbation   • Pulmonary edema   • Acute respiratory failure   • Medically noncompliant   • Congestive heart failure (CHF)   • HLD (hyperlipidemia)   • COPD (chronic obstructive pulmonary disease)   • NSTEMI (non-ST elevated myocardial infarction)   • STARLA (acute kidney injury)   • Hypoxic ischemic encephalopathy (HIE), unspecified   • Dementia   • Oropharyngeal dysphagia   • Acute maxillary sinusitis   • Pneumonia, bilat   • DM (diabetes mellitus)   • Subclinical hypothyroidism   • Acute respiratory failure with hypoxia         Hospital Course  Patient was a 76 y.o. male presented with acute respiratory failure. He was found down at home with noted emesis. He was taken to local hospital, intubated, and transported by air to [facility]. 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 Pt was successfully extubated, but remained rather unresponsive due to likely hypoxic encephalopathy/ROLY. He was seen by cardiology, neurology, and palliative care. He had ongoing dysphagia and family opted to not pursue PEG placement. TFs had been held 2/2 aspiration. He was initially going to be transferred to [facility] in [place], but was determined not to meet their eligibility guidelines. He was then admitted to inpt hospice at [facility].       Consults:   Consults     Date and Time Order Name Status Description    3/28/2017 1623 Inpatient Consult to Neurology Completed     3/28/2017 0946 Inpatient Consult to Palliative Care MD Completed     3/25/2017 0110 Inpatient Consult to Cardiology Completed         Exam confirms with auscultation zero audible heart tones and zero audible respirations. Mr.Franklin JOJO Blanco was pronounced dead at 1235 on 4-. MD notified by Patient's RN.     Ariane Rock,  RN  Clinical House Supervisor  4/10/2017 1:08 PM    [facility]  04/10/17  2:29 PM

## 2017-04-10 NOTE — SIGNIFICANT NOTE
Exam confirms with auscultation zero audible heart tones and zero audible respirations. Mr.Franklin JOJO Blanco was pronounced dead at 1235 on 4-.  MD notified by Patient's RN.    Ariane Rock RN  Clinical House Supervisor  4/10/2017 1:08 PM

## 2017-04-10 NOTE — PROGRESS NOTES
Continued Stay Note  [facility]     Patient Name: Raymond Blnaco  MRN: [id number]  Today's Date: 4/10/2017    Admit Date: 4/6/2017          Discharge Plan       04/10/17 1417    Case Management/Social Work Plan    Plan [facility] inpatient hospice    Additional Comments Chart reviewed.  Visited pt at 1030 and he was resting comfortably.  No family present at that time.  Called by staff nurse and made aware of pt's death at 1235.  No family at bedside.  If hospice RN/CM can be of further assist, call ext [phone].               Discharge Codes     None            Neida Page RN

## 2017-04-10 NOTE — PROGRESS NOTES
"Hospice Progress Note    Patient Name: Raymond Blanco   : 1940  Sex: male    Code Status: Comfort measures and allow natural death.    Date of Admission: 2017    Subjective:    Resting eyes closed comfortably but has needed prns this morning and through the weekend. Does not respond. Making urine. Large BM 17  No family at bedside. Pt laying on Right side. NAD. Mouth breathing.     PRN  Monday  Ativan 0.5m, 0840  Morphine 2m    ROS:  Review of Systems   Unable to perform ROS: Patient unresponsive     Reviewed scheduled and prn medications.     atropine  •  ipratropium-albuterol  •  ketorolac  •  LORazepam  •  LORazepam  •  Morphine  •  ondansetron **OR** ondansetron  •  palliative care oral rinse  •  sodium chloride    Objective:   BP (!) 84/47 (BP Location: Right arm, Patient Position: Lying) Comment: nurse advised  Pulse 77  Temp 98.2 °F (36.8 °C) (Oral)   Resp 22 Comment: nurse advised  Ht 66\" (167.6 cm)  Wt 146 lb 14.4 oz (66.6 kg)  SpO2 (!) 86% Comment: nurse advised  BMI 23.71 kg/m2   Intake & Output (last day)       701 - 04/10 0700 04/10 07 -  0700          Unmeasured Urine Occurrence 2 x         Lab Results (last 24 hours)     ** No results found for the last 24 hours. **        Imaging Results (last 24 hours)     ** No results found for the last 24 hours. **          PPS: Palliative Performance Scale score as of 4/10/2017, 12:09 PM is 10% based on the following measures:   Ambulation: Totally bed bound  Activity and Evidence of Disease: Unable to do any work, extensive evidence of disease  Self-Care: Total care  Intake:  Mouth care only  LOC: Drowsy or coma    Physical Exam:  Physical Exam   Constitutional: No distress.   HENT:   Head: Normocephalic and atraumatic.   Mole dark large under Right eye   Eyes: Right eye exhibits no discharge. Left eye exhibits no discharge.   Neck: No JVD present. No tracheal deviation present.   Cardiovascular: Normal rate.  " Exam reveals no gallop.    No murmur heard.  ? Radial pulses  Nonpalpable pedal pulses  DHT   Pulmonary/Chest: Effort normal. No respiratory distress.   Per nursing, Increased resp earlier - ativan/morphine helped with comfort - pt resting NAD upon exam   Abdominal: Soft. He exhibits no distension. There is no tenderness.   Musculoskeletal: He exhibits no edema or deformity.   Neurological:   Does not follow commands   Skin: Skin is dry. He is not diaphoretic. There is pallor.   Dry mucous membranes   Psychiatric:   Unable to assess       Active Problems:    CHF exacerbation    Pulmonary edema    Acute respiratory failure    Congestive heart failure (CHF)    HLD (hyperlipidemia)    COPD (chronic obstructive pulmonary disease)    NSTEMI (non-ST elevated myocardial infarction)    STARLA (acute kidney injury)    Hypoxic ischemic encephalopathy (HIE), unspecified    Dementia    Oropharyngeal dysphagia    Pneumonia, bilat    DM (diabetes mellitus)    Subclinical hypothyroidism    Acute respiratory failure with hypoxia    Assessment/Plan:    - Schedule Ativan 0.5mg q8H  - Continue to monitor for needs  - Bowel regimen - will schedule one for today - order for prn    Total Time with Patient: 30 minutes  Time In: 11:10  Time Out: 11:40    Time spent reviewing medical record, assessing and examining patient, discussing with family, nursing, answering questions, visiting room/floor, formulating a plan of care, and documentation of care.  > 50% face to face.    Justification for care:  Patient meets criteria for acute in-patient care with required nursing assessment and interventions for symptoms with IV medications.      [facility]  (C) [phone]  (O) [phone]  04/10/17  12:09 PM

## 2017-04-10 NOTE — PLAN OF CARE
Problem: Patient Care Overview (Adult)  Goal: Plan of Care Review  Outcome: Ongoing (interventions implemented as appropriate)    04/10/17 0408   Coping/Psychosocial Response Interventions   Plan Of Care Reviewed With patient   Patient Care Overview   Progress progress toward functional goals as expected   Outcome Evaluation   Outcome Summary/Follow up Plan nonresponsive, hospice pt, no falls no injuries during shift.        Goal: Adult Individualization and Mutuality  Outcome: Ongoing (interventions implemented as appropriate)  Goal: Discharge Needs Assessment  Outcome: Ongoing (interventions implemented as appropriate)    Problem: Dying Patient, Actively (Adult)  Goal: Comfort/Pain Control  Outcome: Ongoing (interventions implemented as appropriate)  Goal: Dying Process, Peace and Dignity  Outcome: Ongoing (interventions implemented as appropriate)    Problem: Pain, Acute (Adult)  Goal: Acceptable Pain Control/Comfort Level  Outcome: Ongoing (interventions implemented as appropriate)